# Patient Record
Sex: MALE | Race: BLACK OR AFRICAN AMERICAN | NOT HISPANIC OR LATINO | URBAN - METROPOLITAN AREA
[De-identification: names, ages, dates, MRNs, and addresses within clinical notes are randomized per-mention and may not be internally consistent; named-entity substitution may affect disease eponyms.]

---

## 2020-07-17 ENCOUNTER — INPATIENT (INPATIENT)
Facility: HOSPITAL | Age: 55
LOS: 2 days | Discharge: HOME | End: 2020-07-20
Attending: STUDENT IN AN ORGANIZED HEALTH CARE EDUCATION/TRAINING PROGRAM | Admitting: STUDENT IN AN ORGANIZED HEALTH CARE EDUCATION/TRAINING PROGRAM
Payer: MEDICAID

## 2020-07-17 VITALS
SYSTOLIC BLOOD PRESSURE: 198 MMHG | DIASTOLIC BLOOD PRESSURE: 116 MMHG | TEMPERATURE: 98 F | HEART RATE: 50 BPM | RESPIRATION RATE: 17 BRPM | OXYGEN SATURATION: 100 %

## 2020-07-17 DIAGNOSIS — Z01.818 ENCOUNTER FOR OTHER PREPROCEDURAL EXAMINATION: ICD-10-CM

## 2020-07-17 DIAGNOSIS — D35.2 BENIGN NEOPLASM OF PITUITARY GLAND: ICD-10-CM

## 2020-07-17 DIAGNOSIS — Z98.52 VASECTOMY STATUS: Chronic | ICD-10-CM

## 2020-07-17 DIAGNOSIS — D69.6 THROMBOCYTOPENIA, UNSPECIFIED: ICD-10-CM

## 2020-07-17 DIAGNOSIS — I10 ESSENTIAL (PRIMARY) HYPERTENSION: ICD-10-CM

## 2020-07-17 DIAGNOSIS — R00.1 BRADYCARDIA, UNSPECIFIED: ICD-10-CM

## 2020-07-17 DIAGNOSIS — R30.0 DYSURIA: ICD-10-CM

## 2020-07-17 DIAGNOSIS — Z98.890 OTHER SPECIFIED POSTPROCEDURAL STATES: Chronic | ICD-10-CM

## 2020-07-17 LAB
ALBUMIN SERPL ELPH-MCNC: 4.4 G/DL — SIGNIFICANT CHANGE UP (ref 3.3–5)
ALP SERPL-CCNC: 48 U/L — SIGNIFICANT CHANGE UP (ref 40–120)
ALT FLD-CCNC: 18 U/L — SIGNIFICANT CHANGE UP (ref 4–41)
ANION GAP SERPL CALC-SCNC: 10 MMO/L — SIGNIFICANT CHANGE UP (ref 7–14)
APPEARANCE UR: CLEAR — SIGNIFICANT CHANGE UP
APTT BLD: 31.9 SEC — SIGNIFICANT CHANGE UP (ref 27–36.3)
AST SERPL-CCNC: 21 U/L — SIGNIFICANT CHANGE UP (ref 4–40)
BASOPHILS # BLD AUTO: 0.05 K/UL — SIGNIFICANT CHANGE UP (ref 0–0.2)
BASOPHILS NFR BLD AUTO: 1.2 % — SIGNIFICANT CHANGE UP (ref 0–2)
BILIRUB SERPL-MCNC: 0.9 MG/DL — SIGNIFICANT CHANGE UP (ref 0.2–1.2)
BILIRUB UR-MCNC: NEGATIVE — SIGNIFICANT CHANGE UP
BLD GP AB SCN SERPL QL: NEGATIVE — SIGNIFICANT CHANGE UP
BLOOD UR QL VISUAL: NEGATIVE — SIGNIFICANT CHANGE UP
BUN SERPL-MCNC: 10 MG/DL — SIGNIFICANT CHANGE UP (ref 7–23)
CALCIUM SERPL-MCNC: 9.4 MG/DL — SIGNIFICANT CHANGE UP (ref 8.4–10.5)
CHLORIDE SERPL-SCNC: 103 MMOL/L — SIGNIFICANT CHANGE UP (ref 98–107)
CO2 SERPL-SCNC: 27 MMOL/L — SIGNIFICANT CHANGE UP (ref 22–31)
COLOR SPEC: SIGNIFICANT CHANGE UP
CREAT SERPL-MCNC: 1.1 MG/DL — SIGNIFICANT CHANGE UP (ref 0.5–1.3)
EOSINOPHIL # BLD AUTO: 0.19 K/UL — SIGNIFICANT CHANGE UP (ref 0–0.5)
EOSINOPHIL NFR BLD AUTO: 4.4 % — SIGNIFICANT CHANGE UP (ref 0–6)
FSH SERPL-MCNC: 3.5 IU/L — SIGNIFICANT CHANGE UP (ref 1.5–12.4)
GH SERPL-MCNC: 0.12 NG/ML — SIGNIFICANT CHANGE UP (ref 0.03–2.47)
GLUCOSE SERPL-MCNC: 111 MG/DL — HIGH (ref 70–99)
GLUCOSE UR-MCNC: NEGATIVE — SIGNIFICANT CHANGE UP
HCT VFR BLD CALC: 41.1 % — SIGNIFICANT CHANGE UP (ref 39–50)
HGB BLD-MCNC: 13.6 G/DL — SIGNIFICANT CHANGE UP (ref 13–17)
HIV 1+2 AB+HIV1 P24 AG SERPL QL IA: SIGNIFICANT CHANGE UP
IMM GRANULOCYTES NFR BLD AUTO: 0.2 % — SIGNIFICANT CHANGE UP (ref 0–1.5)
INR BLD: 1 — SIGNIFICANT CHANGE UP (ref 0.88–1.17)
KETONES UR-MCNC: NEGATIVE — SIGNIFICANT CHANGE UP
LEUKOCYTE ESTERASE UR-ACNC: NEGATIVE — SIGNIFICANT CHANGE UP
LH SERPL-ACNC: 3.7 IU/L — SIGNIFICANT CHANGE UP
LYMPHOCYTES # BLD AUTO: 1.98 K/UL — SIGNIFICANT CHANGE UP (ref 1–3.3)
LYMPHOCYTES # BLD AUTO: 45.8 % — HIGH (ref 13–44)
MAGNESIUM SERPL-MCNC: 2 MG/DL — SIGNIFICANT CHANGE UP (ref 1.6–2.6)
MCHC RBC-ENTMCNC: 29.2 PG — SIGNIFICANT CHANGE UP (ref 27–34)
MCHC RBC-ENTMCNC: 33.1 % — SIGNIFICANT CHANGE UP (ref 32–36)
MCV RBC AUTO: 88.2 FL — SIGNIFICANT CHANGE UP (ref 80–100)
MONOCYTES # BLD AUTO: 0.49 K/UL — SIGNIFICANT CHANGE UP (ref 0–0.9)
MONOCYTES NFR BLD AUTO: 11.3 % — SIGNIFICANT CHANGE UP (ref 2–14)
NEUTROPHILS # BLD AUTO: 1.6 K/UL — LOW (ref 1.8–7.4)
NEUTROPHILS NFR BLD AUTO: 37.1 % — LOW (ref 43–77)
NITRITE UR-MCNC: NEGATIVE — SIGNIFICANT CHANGE UP
NRBC # FLD: 0 K/UL — SIGNIFICANT CHANGE UP (ref 0–0)
PH UR: 6.5 — SIGNIFICANT CHANGE UP (ref 5–8)
PHOSPHATE SERPL-MCNC: 3.3 MG/DL — SIGNIFICANT CHANGE UP (ref 2.5–4.5)
PLATELET # BLD AUTO: 139 K/UL — LOW (ref 150–400)
PMV BLD: 12.1 FL — SIGNIFICANT CHANGE UP (ref 7–13)
POTASSIUM SERPL-MCNC: 3.9 MMOL/L — SIGNIFICANT CHANGE UP (ref 3.5–5.3)
POTASSIUM SERPL-SCNC: 3.9 MMOL/L — SIGNIFICANT CHANGE UP (ref 3.5–5.3)
PROLACTIN SERPL-MCNC: 36.5 NG/ML — HIGH (ref 4.1–18.4)
PROT SERPL-MCNC: 7.3 G/DL — SIGNIFICANT CHANGE UP (ref 6–8.3)
PROT UR-MCNC: NEGATIVE — SIGNIFICANT CHANGE UP
PROTHROM AB SERPL-ACNC: 11.5 SEC — SIGNIFICANT CHANGE UP (ref 9.8–13.1)
RBC # BLD: 4.66 M/UL — SIGNIFICANT CHANGE UP (ref 4.2–5.8)
RBC # FLD: 12.5 % — SIGNIFICANT CHANGE UP (ref 10.3–14.5)
RH IG SCN BLD-IMP: POSITIVE — SIGNIFICANT CHANGE UP
SARS-COV-2 RNA SPEC QL NAA+PROBE: SIGNIFICANT CHANGE UP
SODIUM SERPL-SCNC: 140 MMOL/L — SIGNIFICANT CHANGE UP (ref 135–145)
SP GR SPEC: 1.01 — SIGNIFICANT CHANGE UP (ref 1–1.04)
T3 SERPL-MCNC: 94.2 NG/DL — SIGNIFICANT CHANGE UP (ref 80–200)
T4 AB SER-ACNC: 4.44 UG/DL — LOW (ref 5.1–13)
TSH SERPL-MCNC: 2.02 UIU/ML — SIGNIFICANT CHANGE UP (ref 0.27–4.2)
UROBILINOGEN FLD QL: NORMAL — SIGNIFICANT CHANGE UP
WBC # BLD: 4.32 K/UL — SIGNIFICANT CHANGE UP (ref 3.8–10.5)
WBC # FLD AUTO: 4.32 K/UL — SIGNIFICANT CHANGE UP (ref 3.8–10.5)

## 2020-07-17 PROCEDURE — 77011 CT SCAN FOR LOCALIZATION: CPT | Mod: 26

## 2020-07-17 PROCEDURE — 99223 1ST HOSP IP/OBS HIGH 75: CPT

## 2020-07-17 PROCEDURE — 99285 EMERGENCY DEPT VISIT HI MDM: CPT

## 2020-07-17 RX ORDER — HYDRALAZINE HCL 50 MG
5 TABLET ORAL ONCE
Refills: 0 | Status: COMPLETED | OUTPATIENT
Start: 2020-07-17 | End: 2020-07-17

## 2020-07-17 RX ORDER — AMLODIPINE BESYLATE 2.5 MG/1
5 TABLET ORAL DAILY
Refills: 0 | Status: DISCONTINUED | OUTPATIENT
Start: 2020-07-17 | End: 2020-07-17

## 2020-07-17 RX ORDER — LOSARTAN POTASSIUM 100 MG/1
50 TABLET, FILM COATED ORAL DAILY
Refills: 0 | Status: DISCONTINUED | OUTPATIENT
Start: 2020-07-17 | End: 2020-07-19

## 2020-07-17 RX ORDER — LISINOPRIL 2.5 MG/1
10 TABLET ORAL DAILY
Refills: 0 | Status: DISCONTINUED | OUTPATIENT
Start: 2020-07-17 | End: 2020-07-17

## 2020-07-17 RX ADMIN — LOSARTAN POTASSIUM 50 MILLIGRAM(S): 100 TABLET, FILM COATED ORAL at 19:47

## 2020-07-17 RX ADMIN — Medication 5 MILLIGRAM(S): at 16:07

## 2020-07-17 RX ADMIN — LISINOPRIL 10 MILLIGRAM(S): 2.5 TABLET ORAL at 16:07

## 2020-07-17 NOTE — CONSULT NOTE ADULT - PROBLEM SELECTOR RECOMMENDATION 9
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 Secreting adenoma differential includes prolactinoma, growth hormone secreting, ACTH secreting. Pt w/o cushings appearence, glucose wnl. 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   - please consult endocrine for management regarding inititaion of dopamine agonist cabergoline or bromocriptine as neurosurgical intervention pending.

## 2020-07-17 NOTE — ED PROVIDER NOTE - NS ED ROS FT
REVIEW OF SYSTEMS:    CONSTITUTIONAL: No weakness, fevers or chills  EYES/ENT: Chronic? blurry vision;  No throat pain   NECK: No pain or stiffness  RESPIRATORY: No cough, wheezing, hemoptysis; No shortness of breath  CARDIOVASCULAR: No chest pain or palpitations  GASTROINTESTINAL: No abdominal pain; nausea, vomiting;  diarrhea or constipation. No hematemesis, melena or hematochezia.  GENITOURINARY: No dysuria, frequency or hematuria  NEUROLOGICAL: No numbness or weakness  SKIN: No itching, burning, rashes, or lesions   All other review of systems is negative unless indicated above.

## 2020-07-17 NOTE — CONSULT NOTE ADULT - PROBLEM SELECTOR RECOMMENDATION 5
- pt reports burning with urination, chronic UA reviewed w/o nitrites/leukocyte, no fevers, WBC wnl, would monitor off abx at this time  - pt w/ hx of vissectomy, consideration for ureteral stricture as pt endorses mild suprapubic tenderness, denies urinary incontinence.  - obtain post void bladder scan to make sure not retaining, can have outpatient urologic evaluation.

## 2020-07-17 NOTE — CONSULT NOTE ADULT - PROBLEM SELECTOR RECOMMENDATION 4
- mild thrombocytopenia w/o anemia, VI low concern for malignant hypertensive process, continue to trend at this time

## 2020-07-17 NOTE — CONSULT NOTE ADULT - PROBLEM SELECTOR RECOMMENDATION 2
- systolic consistently > 200 during inpatient stay. Per patient long family history of essential HTN in mother and siblings. Reports BP at home systolic range in 130s. On no antihypertensives at home, uses dietary measures for control. He reports during inpatient hospital stay his blood pressure increasingly elevated from anxiety/stress.   - continue with losartan 50 mg QD, if not controlled (systolic > 140) can start amlodipine 5 mg QD. If patient BP not controlled on ARB and calcium channel blocker may require thiazide.   - No electrolyte abnormalities to suggest secondary HTN (hyperaldosteronism, hyper-renin) however if patient BP not controlled with increasing antihypertensives would do renal US w/ doppler to r/o CHRIS and send renin/aldosterone levels for follow up.

## 2020-07-17 NOTE — ED ADULT TRIAGE NOTE - CHIEF COMPLAINT QUOTE
pt sent by PMD for neurosurgeon eval 2/2 chiasmal mass found on MRI causes visual changes.  pt arrives with MRI results, visual field test copy and ophthalmology note. pt hypertensive in triage.

## 2020-07-17 NOTE — CONSULT NOTE ADULT - ASSESSMENT
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

## 2020-07-17 NOTE — ED PROVIDER NOTE - OBJECTIVE STATEMENT
Mr. Verde is a 55 yo M with a PMH of TIA presenting from his neuro-opthalmology clinic for bitemporal visual field deficits and known chiasmal mass. In April, he presented for care when he had numbness/tingling in his hands and feet. He had a CTH and then An MRI on 4/13/2020 which demonstrated a sellar/suprasellar mass c/w pituitary adenoma. FSH, LH, ACTH, AM cortisol, TSH were WNL. Prolactin was elevated to 30.6. Then, he was seen by neuro-ophthalmology this morning where bitemporal visual field deficits were observed. He was sent to the ED for emergent neurosurgical evaluation.    He denies headache (even with straining, coughing), vomiting, . Vision worsening over last 5-10 years. Mr. Verde is a 53 yo M with a PMH of TIA presenting from his neuro-opthalmology clinic for bitemporal visual field deficits and known chiasmal mass. In April, he presented for care when he had numbness/tingling in his hands and feet. He had a CTH and then An MRI on 4/13/2020 which demonstrated a sellar/suprasellar mass c/w pituitary adenoma. FSH, LH, ACTH, AM cortisol, TSH were WNL. Prolactin was elevated to 30.6. Then, he was seen by neuro-ophthalmology this morning where bitemporal visual field deficits were observed. He was sent to the ED for emergent neurosurgical evaluation. He denies feeling that he cannot see out of the sides of his vision.    He denies headache (even with straining, coughing), vomiting, hearing changes. Vision worsening over last 5-10 years.

## 2020-07-17 NOTE — ED PROVIDER NOTE - ATTENDING CONTRIBUTION TO CARE
Pt was seen and evaluated by me. Pt is a 53 y/o male with a PMHx of TIA who presented to the ED for concern for mass. Pt was sent in by his neuro-ophthalmologist for bitemporal visual field deficits with concern for chiasmal mass. Pt states 3 months ago he had some numbness to hands and feet. Pt had an MRI on 4/13/20 that showed an suprasellar mass. Pt had hormone levels drawn that included FSH, LH, ACTH, AM cortisol, and TSH which were reported to be within normal limits. Prolactin was reported as elevated at 30.6. Pt's neuro-ophthalmologist sent pt in today for neurosurgery eval when he was noted to have bitemporal visual field deficits. Pt admits to blurry vision X years. Pt denies any headache, weakness, numbness, fever, chills, nausea, vomiting, SOB, chest pain, or abd pain. Lungs CTA b/l. RRR. Abd soft, non-tender. No focal deficits. VA 20/70 b/l.   Concern for intracranial mass  Labs, CT, Neurosurgery eval

## 2020-07-17 NOTE — CONSULT NOTE ADULT - SUBJECTIVE AND OBJECTIVE BOX
BIJU THAIS  55y  Male      Patient is a 55y old  Male who presents with a chief complaint of sent by Neuro Ophtho (2020 12:59)      INTERVAL HPI/OVERNIGHT EVENTS:    The patient is a 54 yo man with PMH of HTN, TIA?, known history of sellar lesion presenting from outpatient neuro-opthamology clinic for neurosurgical evaluation for worsening bitemporal hemianopsia. The patient reports having L sided UE/LE numbness/tingling in  prompting evaluation at [facility]. Imaging at that time revealed 3.3x2.3.x1.0 cm sella. Pt had f/u appointment at [facility] , found to have bitemporal visual field deficits. The patient endorses blurriness of vision, no diplopia. No headache. He further denies CP, SOB, abd pain, N/V. Pt reports history of pituitary tumor in cousin in [place] w/ visual field deficits that was resected. He denies worsening fatigue, changes in weight, skin changes, palpitations. He endorses worsening libido and erectile dysfunction for the past year. Pt has strong family history of hypertension, not on antihypertensives at home, reports measure BP at home to be systolic 130s on average. No prior cardiac history, including no PCI, CABG, valvular surgery No history of smoking.       REVIEW OF SYSTEMS:  CONSTITUTIONAL: No fever, weight loss, or fatigue  EYES: No eye pain, visual disturbances, or discharge  ENMT:  No difficulty hearing, tinnitus, vertigo; No sinus or throat pain  NECK: No pain or stiffness  BREASTS: No pain, masses, or nipple discharge  RESPIRATORY: No cough, wheezing, chills or hemoptysis; No shortness of breath  CARDIOVASCULAR: No chest pain, palpitations, dizziness, or leg swelling  GASTROINTESTINAL: No abdominal or epigastric pain. No nausea, vomiting, or hematemesis; No diarrhea or constipation. No melena or hematochezia.  GENITOURINARY: No dysuria, frequency, hematuria, or incontinence  NEUROLOGICAL: No headaches, memory loss, loss of strength, numbness, or tremors  SKIN: No itching, burning, rashes, or lesions   LYMPH NODES: No enlarged glands  ENDOCRINE: No heat or cold intolerance; No hair loss  MUSCULOSKELETAL: No joint pain or swelling; No muscle, back, or extremity pain  PSYCHIATRIC: No depression, anxiety, mood swings, or difficulty sleeping  HEME/LYMPH: No easy bruising, or bleeding gums  ALLERY AND IMMUNOLOGIC: No hives or eczema    T(C): 36.9 (20 @ 17:43), Max: 36.9 (20 @ 17:43)  HR: 51 (20 @ 17:43) (50 - 51)  BP: 198/114 (20 @ 17:43) (198/99 - 207/107)  RR: 18 (20 @ 17:43) (14 - 18)  SpO2: 100% (20 @ 17:43) (100% - 100%)  Wt(kg): --Vital Signs Last 24 Hrs  T(C): 36.9 (2020 17:43), Max: 36.9 (2020 17:43)  T(F): 98.5 (2020 17:43), Max: 98.5 (2020 17:43)  HR: 51 (2020 17:43) (50 - 51)  BP: 198/114 (2020 17:43) (198/99 - 207/107)  BP(mean): --  RR: 18 (2020 17:43) (14 - 18)  SpO2: 100% (2020 17:43) (100% - 100%)    PHYSICAL EXAM:  GENERAL: NAD, well-groomed, well-developed  HEAD:  Atraumatic, Normocephalic  EYES: EOMI, PERRLA, conjunctiva and sclera clear  ENMT: No tonsillar erythema, exudates, or enlargement; Moist mucous membranes, Good dentition, No lesions  NECK: Supple, No JVD, Normal thyroid  NERVOUS SYSTEM:  Alert & Oriented X3, Good concentration; Motor Strength 5/5 B/L upper and lower extremities; DTRs 2+ intact and symmetric  CHEST/LUNG: Clear to percussion bilaterally; No rales, rhonchi, wheezing, or rubs  HEART: Regular rate and rhythm; No murmurs, rubs, or gallops  ABDOMEN: Soft, Nontender, Nondistended; Bowel sounds present  EXTREMITIES:  2+ Peripheral Pulses, No clubbing, cyanosis, or edema  LYMPH: No lymphadenopathy noted  SKIN: No rashes or lesions    Consultant(s) Notes Reviewed:  [x ] YES  [ ] NO  Care Discussed with Consultants/Other Providers [ x] YES  [ ] NO    LABS:                        13.6   4.32  )-----------( 139      ( 2020 11:33 )             41.1     -    140  |  103  |  10  ----------------------------<  111<H>  3.9   |  27  |  1.10    Ca    9.4      2020 11:33  Phos  3.3       Mg     2.0         TPro  7.3  /  Alb  4.4  /  TBili  0.9  /  DBili  x   /  AST  21  /  ALT  18  /  AlkPhos  48  07-17    PT/INR - ( 2020 11:33 )   PT: 11.5 SEC;   INR: 1.00          PTT - ( 2020 11:33 )  PTT:31.9 SEC  Urinalysis Basic - ( 2020 13:26 )    Color: LIGHT YELLOW / Appearance: CLEAR / S.010 / pH: 6.5  Gluc: NEGATIVE / Ketone: NEGATIVE  / Bili: NEGATIVE / Urobili: NORMAL   Blood: NEGATIVE / Protein: NEGATIVE / Nitrite: NEGATIVE   Leuk Esterase: NEGATIVE / RBC: x / WBC x   Sq Epi: x / Non Sq Epi: x / Bacteria: x      CAPILLARY BLOOD GLUCOSE            Urinalysis Basic - ( 2020 13:26 )    Color: LIGHT YELLOW / Appearance: CLEAR / S.010 / pH: 6.5  Gluc: NEGATIVE / Ketone: NEGATIVE  / Bili: NEGATIVE / Urobili: NORMAL   Blood: NEGATIVE / Protein: NEGATIVE / Nitrite: NEGATIVE   Leuk Esterase: NEGATIVE / RBC: x / WBC x   Sq Epi: x / Non Sq Epi: x / Bacteria: x        RADIOLOGY & ADDITIONAL TESTS:    Imaging Personally Reviewed:  [ ] YES  [ ] NO

## 2020-07-17 NOTE — H&P ADULT - NSHPPHYSICALEXAM_GEN_ALL_CORE
awake, alert, affect appropriate  PERRL, EOMI  +Bitemporal visual loss - decrease  YIN x4 with good strength  No drift

## 2020-07-17 NOTE — ED PROVIDER NOTE - CLINICAL SUMMARY MEDICAL DECISION MAKING FREE TEXT BOX
Mr. Celaya is a 56 yo M with a chiasmal mass, bitemporal visual field deficits, bradycardia and hypertension raising concern for pituitary adenoma causing increased intracranial pressure. Presently, he is well appearing, but these visual field deficits and possible cushing reaction are concerning. Will consult neurosurgery and send pre-op labs. Complains of dysuria so will also send UA.

## 2020-07-17 NOTE — ED PROVIDER NOTE - PROGRESS NOTE DETAILS
Case d/w neurosurgery. Will obtain stereotactic cth in addition to prolactin, TSH, LH< FSH, GH, T3, T4, ACTH, Human GH, IGF1, FSH. Dr. Mathis: Pt seen by Neurosurg and will admit.

## 2020-07-17 NOTE — CONSULT NOTE ADULT - PROBLEM SELECTOR RECOMMENDATION 3
- likely vagal induced in setting of elevated systolic BP. EKG reviewed sinus w/ DC interval wnl w/o AV block. Expect heart rate to normalize with better BP control.

## 2020-07-17 NOTE — ED ADULT NURSE NOTE - OBJECTIVE STATEMENT
Receive pt. in ER room 23 alert and oriented x 4, presenting to the ER sent by PMD for visual changes due to pituitary tumor. Pt. have a history of HTN and stated " I was diagnosed with a pituitary tumor last month and it changes my vision". No c/o pain no respiratory distress, no dizziness. Placed on cardiac monitor, labs sent will continue to monitor.

## 2020-07-17 NOTE — H&P ADULT - HISTORY OF PRESENT ILLNESS
55y male w/ hx of sella mass diagnosed April 2020. Was seeing Neuro-ophtho clinic at [facility] and was sent into the hospital today for worsening bitemporal hemianopsia likely 2/2 sella mass. Per MRI report from [place] on 4/13/2020 he has a 3.3x2.3x1.9cm sella lesion. Patient admits to worsening visual fields. Denies any other symptoms like headache, increased thirst, difficulty urinating etc. 55y male w/ hx of sella mass diagnosed April 2020. Was seeing Neuro-ophtho clinic at [facility] yesterday and was sent into the hospital today for worsening bitemporal hemianopsia likely 2/2 sella mass. Per MRI report from [place] on 4/13/2020 he has a 3.3x2.3x1.9cm sella lesion. Patient admits to worsening visual fields. Denies any other symptoms like headache, increased thirst, difficulty urinating etc.

## 2020-07-17 NOTE — H&P ADULT - ASSESSMENT
55y male w/ sella mass now presenting with worsening vision loss  - Admit to Neurosurgery  -Endocrine consult, pituitary lab work up  -Vitals q4 hrs  -MRI brain w/wo contrast and sella protocol   -D/w Dr Briscoe

## 2020-07-17 NOTE — CHART NOTE - NSCHARTNOTEFT_GEN_A_CORE
CAPRINI SCORE [CLOT]    AGE RELATED RISK FACTORS                                                       MOBILITY RELATED FACTORS  [x ] Age 41-60 years                                            (1 Point)                  [ ] Bed rest                                                        (1 Point)  [ ] Age: 61-74 years                                           (2 Points)                 [ ] Plaster cast                                                   (2 Points)  [ ] Age= 75 years                                              (3 Points)                 [ ] Bed bound for more than 72 hours                 (2 Points)    DISEASE RELATED RISK FACTORS                                               GENDER SPECIFIC FACTORS  [ ] Edema in the lower extremities                       (1 Point)                  [ ] Pregnancy                                                     (1 Point)  [ ] Varicose veins                                               (1 Point)                  [ ] Post-partum < 6 weeks                                   (1 Point)             [ ] BMI > 25 Kg/m2                                            (1 Point)                  [ ] Hormonal therapy  or oral contraception          (1 Point)                 [ ] Sepsis (in the previous month)                        (1 Point)                  [ ] History of pregnancy complications                 (1 point)  [ ] Pneumonia or serious lung disease                                               [ ] Unexplained or recurrent                     (1 Point)           (in the previous month)                               (1 Point)  [ ] Abnormal pulmonary function test                     (1 Point)                 SURGERY RELATED RISK FACTORS  [ ] Acute myocardial infarction                              (1 Point)                 [ ]  Section                                             (1 Point)  [ ] Congestive heart failure (in the previous month)  (1 Point)               [ ] Minor surgery                                                  (1 Point)   [ ] Inflammatory bowel disease                             (1 Point)                 [ ] Arthroscopic surgery                                        (2 Points)  [ ] Central venous access                                      (2 Points)                [ ] General surgery lasting more than 45 minutes   (2 Points)       [ ] Stroke (in the previous month)                          (5 Points)               [ ] Elective arthroplasty                                         (5 Points)                                                                                                                                               HEMATOLOGY RELATED FACTORS                                                 TRAUMA RELATED RISK FACTORS  [ ] Prior episodes of VTE                                     (3 Points)                [ ] Fracture of the hip, pelvis, or leg                       (5 Points)  [ ] Positive family history for VTE                         (3 Points)                 [ ] Acute spinal cord injury (in the previous month)  (5 Points)  [ ] Prothrombin 15268 A                                     (3 Points)                 [ ] Paralysis  (less than 1 month)                             (5 Points)  [ ] Factor V Leiden                                             (3 Points)                  [ ] Multiple Trauma within 1 month                        (5 Points)  [ ] Lupus anticoagulants                                     (3 Points)                                                           [ ] Anticardiolipin antibodies                               (3 Points)                                                       [ ] High homocysteine in the blood                      (3 Points)                                             [ ] Other congenital or acquired thrombophilia      (3 Points)                                                [ ] Heparin induced thrombocytopenia                  (3 Points)                                          Total Score [    1      ]    Caprini Score 0 - 2:  Low Risk, No VTE Prophylaxis required for most patients, encourage ambulation  Caprini Score 3 - 6:  At Risk, pharmacologic VTE prophylaxis is indicated for most patients (in the absence of a contraindication)  Caprini Score Greater than or = 7:  High Risk, pharmacologic VTE prophylaxis is indicated for most patients (in the absence of a contraindication)

## 2020-07-17 NOTE — CONSULT NOTE ADULT - PROBLEM SELECTOR RECOMMENDATION 6
- pt w/o any history of general anestesia, reports only surgery is vissectomy with local anestesia w/o complications. Pt w/o bleeding disorders, no AC use at home. No history of smoking, MOOSE, COPD. Able to walk 3-4 miles with stopping.   - RCRI in setting of possible TIA in 4/20 score 1 placing patient with 6.0% 30 day risk of death, MI, or cardiac arrest after surgery.  - Patient's blood pressure needs to be further optimized prior to surgery

## 2020-07-17 NOTE — ED PROVIDER NOTE - PHYSICAL EXAMINATION
PHYSICAL EXAM:  General: No acute distress.  HEENT: NCAT.  PERRL.  EOMI.  No scleral icterus or injection.  Moist MM.  No oropharyngeal exudates.    Neck: Supple.  Full ROM.  No JVD.  No thyromegaly. No lymphadenopathy.   Heart: RRR. Bradycardic.  Normal S1 and S2.  No murmurs, rubs, or gallops.   Lungs: CTAB. No wheezes, crackles, or rhonchi.    Abdomen: BS+, soft, NT/ND.  No organomegaly.  Skin: Warm and dry.  No rashes.  Extremities: No edema, clubbing, or cyanosis.  2+ peripheral pulses b/l.  Musculoskeletal: No deformities.  No spinal or paraspinal tenderness.  Neuro: A&Ox3.  CN II - 20/70 bilaterally, fields intact to confrontation. PERRL. CNIII - L eye does not move fully laterally. No nystagmus. IV-XII intact.  5/5 strength in UE and LE b/l.  Tactile sensation intact in UE and LE b/l. 2+ biceps and brachioradialis bilaterally.

## 2020-07-18 LAB — ACTH SER-ACNC: 29.7 PG/ML — SIGNIFICANT CHANGE UP (ref 7.2–63.3)

## 2020-07-18 PROCEDURE — 70553 MRI BRAIN STEM W/O & W/DYE: CPT | Mod: 26

## 2020-07-18 PROCEDURE — 99232 SBSQ HOSP IP/OBS MODERATE 35: CPT

## 2020-07-18 RX ADMIN — LOSARTAN POTASSIUM 50 MILLIGRAM(S): 100 TABLET, FILM COATED ORAL at 05:54

## 2020-07-18 NOTE — PROGRESS NOTE ADULT - PROBLEM SELECTOR PLAN 3
likely vagal induced in setting of elevated systolic BP. Asymptomatic  EKG: sinus w/ OR interval wnl w/o AV block.   Expect heart rate to normalize with better BP control.

## 2020-07-18 NOTE — PROGRESS NOTE ADULT - PROBLEM SELECTOR PLAN 1
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  - Endocrine consult recommended

## 2020-07-18 NOTE — PROGRESS NOTE ADULT - SUBJECTIVE AND OBJECTIVE BOX
CHIEF COMPLAINT: f/u     SUBJECTIVE / OVERNIGHT EVENTS: No acute events overnight. Denies chest pain, SOB, N/V, lightheadedness. States he feels blurry vision is slightly improved now that his BP is improving.    MEDICATIONS  (STANDING):  losartan 50 milliGRAM(s) Oral daily    MEDICATIONS  (PRN):      VITALS:  T(F): 98.5 (20 @ 09:44), Max: 98.5 (20 @ 17:43)  HR: 58 (20 @ 09:44) (51 - 58)  BP: 147/89 (20 @ 09:44) (137/94 - 207/107)  RR: 17 (20 @ 09:44) (14 - 18)  SpO2: 100% (20 @ 09:44)  Wt(kg): --    Weight (kg): 104.5 (17:43)    CAPILLARY BLOOD GLUCOSE      PHYSICAL EXAM:  GENERAL: NAD, on room air  HEENT: tracking with eyes, sclera clear, wearing reading glasses  CHEST/LUNG: Clear to auscultation bilaterally; No wheeze  HEART: Regular rate and rhythm; No murmurs, rubs, or gallops  ABDOMEN: Soft, Nontender, Nondistended; Bowel sounds present  EXTREMITIES: WWP, no edema  PSYCH: AAOx3  NEUROLOGY: 5/5 muscle strength in upper and lower extremities  SKIN: No rashes or lesions    LABS:              13.6                 140  | 27   | 10           4.32  >-----------< 139     ------------------------< 111                   41.1                 3.9  | 103  | 1.10                                         Ca 9.4   Mg 2.0   Ph 3.3         TPro  7.3  /  Alb  4.4      TBili  0.9  /  DBili  x         AST  21  /  ALT  18            AlkPhos  48      INR: 1.00 ;    PT: 11.5 SEC;    PTT: 31.9 SEC        Urinalysis Basic - ( 2020 13:26 )    Color: LIGHT YELLOW / Appearance: CLEAR / S.010 / pH: 6.5  Gluc: NEGATIVE / Ketone: NEGATIVE  / Bili: NEGATIVE / Urobili: NORMAL   Blood: NEGATIVE / Protein: NEGATIVE / Nitrite: NEGATIVE   Leuk Esterase: NEGATIVE / RBC: x / WBC x   Sq Epi: x / Non Sq Epi: x / Bacteria: x            MICROBIOLOGY:        RADIOLOGY & ADDITIONAL TESTS:  Imaging Personally Reviewed: [ ] Yes    [ ] Consultant(s) Notes Reviewed:  [x] Care Discussed with Consultants/Other Providers:

## 2020-07-18 NOTE — PROGRESS NOTE ADULT - PROBLEM SELECTOR PLAN 6
pt w/o any history of general anesthesia, reports only surgery is vasectomy with local anesthesia w/o complications. Pt w/o bleeding disorders, no AC use at home. No history of smoking, MOOSE, COPD. Able to walk 3-4 miles with stopping.   - RCRI in setting of possible TIA in 4/20 score 1 placing patient with 6.0% 30 day risk of death, MI, or cardiac arrest after surgery.  BP better controlled  No further cardiac workup required prior to OR

## 2020-07-18 NOTE — PROGRESS NOTE ADULT - ASSESSMENT
54 yo man with PMH of HTN (not adherent to meds), TIA?, known history of sellar lesion presenting from outpatient neuro-opthamology clinic for neurosurgical evaluation for worsening bitemporal hemianopsia likely 2/2 sellar lesion and found also to have uncontrolled HTN

## 2020-07-18 NOTE — PROGRESS NOTE ADULT - SUBJECTIVE AND OBJECTIVE BOX
No issues overnight  Vital Signs Last 24 Hrs  T(C): 36.7 (18 Jul 2020 01:28), Max: 36.9 (17 Jul 2020 17:43)  T(F): 98 (18 Jul 2020 01:28), Max: 98.5 (17 Jul 2020 17:43)  HR: 54 (18 Jul 2020 01:28) (50 - 54)  BP: 165/113 (18 Jul 2020 01:28) (155/103 - 207/107)  BP(mean): --  RR: 18 (18 Jul 2020 01:28) (14 - 18)  SpO2: 100% (18 Jul 2020 01:28) (100% - 100%)    AAO X 3  PERRLA, EOMI  Bitemporal anopsia  YIN strength 5/5  SILT    MEDICATIONS  (STANDING):  losartan 50 milliGRAM(s) Oral daily    MEDICATIONS  (PRN):    07-17    140  |  103  |  10  ----------------------------<  111<H>  3.9   |  27  |  1.10    Ca    9.4      17 Jul 2020 11:33  Phos  3.3     07-17  Mg     2.0     07-17    TPro  7.3  /  Alb  4.4  /  TBili  0.9  /  DBili  x   /  AST  21  /  ALT  18  /  AlkPhos  48  07-17                          13.6   4.32  )-----------( 139      ( 17 Jul 2020 11:33 )             41.1     < from: CT Stereotactic Localization No Cont (07.17.20 @ 12:35) >  Multiple thin axial sections were performed from base of skull to vertex without contrast enhancement. Thisexam is performed date stereotactic localization purposes.    No prior studies available for comparison.    Asymmetry of the lateral ventricles are seen. The right lateral ventricle is slightly more prominent than the left.    There is evidence of a soft tissue mass involving the sella/suprasellar region. 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 This lesion measures approximately 3.0 x 3.6 cm. There is no acute hemorrhage mass or mass effect in the posterior fossa or supratentorial region.    Evaluation of the osseous structures with the appropriate window appears unremarkable    Minimal mucosal thickening is seen in the left maxillary sinus.    Both mastoid and middle ear regions appear clear.    IMPRESSION: Sella/suprasellar lesion as described above.    < end of copied text >

## 2020-07-18 NOTE — PROGRESS NOTE ADULT - PROBLEM SELECTOR PLAN 2
uncontrolled on admission with SBP up to 200s, now improved,   SBP in 140s ok for now, allow for gradual normotension  Continue losartan 50mg  Patient refusing norvasc due to LE edema

## 2020-07-19 LAB
ALBUMIN SERPL ELPH-MCNC: 4.3 G/DL — SIGNIFICANT CHANGE UP (ref 3.3–5)
ALP SERPL-CCNC: 43 U/L — SIGNIFICANT CHANGE UP (ref 40–120)
ALT FLD-CCNC: 15 U/L — SIGNIFICANT CHANGE UP (ref 4–41)
ANION GAP SERPL CALC-SCNC: 10 MMO/L — SIGNIFICANT CHANGE UP (ref 7–14)
APTT BLD: 30.5 SEC — SIGNIFICANT CHANGE UP (ref 27–36.3)
AST SERPL-CCNC: 16 U/L — SIGNIFICANT CHANGE UP (ref 4–40)
BILIRUB SERPL-MCNC: 0.9 MG/DL — SIGNIFICANT CHANGE UP (ref 0.2–1.2)
BUN SERPL-MCNC: 13 MG/DL — SIGNIFICANT CHANGE UP (ref 7–23)
CALCIUM SERPL-MCNC: 9.3 MG/DL — SIGNIFICANT CHANGE UP (ref 8.4–10.5)
CHLORIDE SERPL-SCNC: 104 MMOL/L — SIGNIFICANT CHANGE UP (ref 98–107)
CO2 SERPL-SCNC: 24 MMOL/L — SIGNIFICANT CHANGE UP (ref 22–31)
CORTIS SERPL-MCNC: 4.1 UG/DL — SIGNIFICANT CHANGE UP (ref 2.7–18.4)
CREAT SERPL-MCNC: 1.11 MG/DL — SIGNIFICANT CHANGE UP (ref 0.5–1.3)
GLUCOSE SERPL-MCNC: 93 MG/DL — SIGNIFICANT CHANGE UP (ref 70–99)
HCT VFR BLD CALC: 40.8 % — SIGNIFICANT CHANGE UP (ref 39–50)
HGB BLD-MCNC: 13.3 G/DL — SIGNIFICANT CHANGE UP (ref 13–17)
IGF BP1 SERPL-MCNC: 98 NG/ML — SIGNIFICANT CHANGE UP (ref 74–255)
INR BLD: 1.03 — SIGNIFICANT CHANGE UP (ref 0.88–1.17)
MCHC RBC-ENTMCNC: 28.4 PG — SIGNIFICANT CHANGE UP (ref 27–34)
MCHC RBC-ENTMCNC: 32.6 % — SIGNIFICANT CHANGE UP (ref 32–36)
MCV RBC AUTO: 87 FL — SIGNIFICANT CHANGE UP (ref 80–100)
NRBC # FLD: 0 K/UL — SIGNIFICANT CHANGE UP (ref 0–0)
PLATELET # BLD AUTO: 159 K/UL — SIGNIFICANT CHANGE UP (ref 150–400)
PMV BLD: 12 FL — SIGNIFICANT CHANGE UP (ref 7–13)
POTASSIUM SERPL-MCNC: 3.9 MMOL/L — SIGNIFICANT CHANGE UP (ref 3.5–5.3)
POTASSIUM SERPL-SCNC: 3.9 MMOL/L — SIGNIFICANT CHANGE UP (ref 3.5–5.3)
PROT SERPL-MCNC: 6.9 G/DL — SIGNIFICANT CHANGE UP (ref 6–8.3)
PROTHROM AB SERPL-ACNC: 11.8 SEC — SIGNIFICANT CHANGE UP (ref 9.8–13.1)
RBC # BLD: 4.69 M/UL — SIGNIFICANT CHANGE UP (ref 4.2–5.8)
RBC # FLD: 12.3 % — SIGNIFICANT CHANGE UP (ref 10.3–14.5)
SODIUM SERPL-SCNC: 138 MMOL/L — SIGNIFICANT CHANGE UP (ref 135–145)
WBC # BLD: 3.97 K/UL — SIGNIFICANT CHANGE UP (ref 3.8–10.5)
WBC # FLD AUTO: 3.97 K/UL — SIGNIFICANT CHANGE UP (ref 3.8–10.5)

## 2020-07-19 PROCEDURE — 99232 SBSQ HOSP IP/OBS MODERATE 35: CPT

## 2020-07-19 RX ORDER — HYDRALAZINE HCL 50 MG
5 TABLET ORAL ONCE
Refills: 0 | Status: COMPLETED | OUTPATIENT
Start: 2020-07-19 | End: 2020-07-19

## 2020-07-19 RX ORDER — LOSARTAN POTASSIUM 100 MG/1
100 TABLET, FILM COATED ORAL DAILY
Refills: 0 | Status: DISCONTINUED | OUTPATIENT
Start: 2020-07-19 | End: 2020-07-20

## 2020-07-19 RX ADMIN — Medication 5 MILLIGRAM(S): at 05:13

## 2020-07-19 RX ADMIN — LOSARTAN POTASSIUM 100 MILLIGRAM(S): 100 TABLET, FILM COATED ORAL at 09:33

## 2020-07-19 RX ADMIN — Medication 5 MILLIGRAM(S): at 17:28

## 2020-07-19 NOTE — PROGRESS NOTE ADULT - PROBLEM SELECTOR PLAN 3
likely vagal induced in setting of elevated systolic BP. Asymptomatic  EKG: sinus w/ IL interval wnl w/o AV block.   Expect heart rate to normalize with better BP control, possible effect from brain mass?

## 2020-07-19 NOTE — PROGRESS NOTE ADULT - SUBJECTIVE AND OBJECTIVE BOX
CHIEF COMPLAINT: f/u     SUBJECTIVE / OVERNIGHT EVENTS: No acute events overnight. Denies chest pain, SOB, dizziness or lightheadedness. No N/V    MEDICATIONS  (STANDING):  losartan 100 milliGRAM(s) Oral daily    MEDICATIONS  (PRN):      VITALS:  T(F): 98 (20 @ 09:31), Max: 98.6 (20 @ 17:23)  HR: 51 (20 @ 09:31) (48 - 56)  BP: 149/91 (20 @ 09:31) (148/83 - 179/96)  RR: 18 (20 @ 09:31) (16 - 18)  SpO2: 99% (20 @ 09:31)  Wt(kg): --      CAPILLARY BLOOD GLUCOSE      PHYSICAL EXAM:  GENERAL: NAD, on room air  HEENT: tracking with eyes, sclera clear, wearing reading glasses  CHEST/LUNG: Clear to auscultation bilaterally; No wheeze  HEART: Regular rate and rhythm; No murmurs, rubs, or gallops  ABDOMEN: Soft, Nontender, Nondistended; Bowel sounds present  EXTREMITIES: WWP, no edema  PSYCH: AAOx3  NEUROLOGY: 5/5 muscle strength in upper and lower extremities  SKIN: No rashes or lesions    LABS:              13.3                 138  | 24   | 13           3.97  >-----------< 159     ------------------------< 93                    40.8                 3.9  | 104  | 1.11                                         Ca 9.3   Mg x     Ph x           TPro  6.9  /  Alb  4.3      TBili  0.9  /  DBili  x         AST  16  /  ALT  15            AlkPhos  43      INR: 1.03 ;    PT: 11.8 SEC;    PTT: 30.5 SEC        Urinalysis Basic - ( 2020 13:26 )    Color: LIGHT YELLOW / Appearance: CLEAR / S.010 / pH: 6.5  Gluc: NEGATIVE / Ketone: NEGATIVE  / Bili: NEGATIVE / Urobili: NORMAL   Blood: NEGATIVE / Protein: NEGATIVE / Nitrite: NEGATIVE   Leuk Esterase: NEGATIVE / RBC: x / WBC x   Sq Epi: x / Non Sq Epi: x / Bacteria: x            MICROBIOLOGY:        RADIOLOGY & ADDITIONAL TESTS:  Imaging Personally Reviewed: [ ] Yes    [ ] Consultant(s) Notes Reviewed:  [x] Care Discussed with Consultants/Other Providers:

## 2020-07-19 NOTE — PROGRESS NOTE ADULT - SUBJECTIVE AND OBJECTIVE BOX
No issues overnight  Vital Signs Last 24 Hrs  T(C): 36.6 (19 Jul 2020 01:27), Max: 37 (18 Jul 2020 17:23)  T(F): 97.9 (19 Jul 2020 01:27), Max: 98.6 (18 Jul 2020 17:23)  HR: 48 (19 Jul 2020 01:27) (48 - 58)  BP: 179/96 (19 Jul 2020 01:27) (137/94 - 179/96)  BP(mean): --  RR: 18 (19 Jul 2020 01:27) (16 - 18)  SpO2: 97% (19 Jul 2020 01:27) (97% - 100%)    AAO X 3  PERRLA, EOMI  Bitemporal anopsia  YIN strength 5/5  SILT    MEDICATIONS  (STANDING):  losartan 50 milliGRAM(s) Oral daily    MEDICATIONS  (PRN):

## 2020-07-19 NOTE — PROGRESS NOTE ADULT - PROBLEM SELECTOR PLAN 1
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  - Endocrine consult recommended  Plan per neurosurgery

## 2020-07-19 NOTE — PROGRESS NOTE ADULT - PROBLEM SELECTOR PLAN 2
uncontrolled on admission with SBP up to 200s, now improved  SBP in 140s ok for now, allow for gradual normotension  increase losartan 100mg  Patient refusing norvasc due to LE edema

## 2020-07-20 ENCOUNTER — TRANSCRIPTION ENCOUNTER (OUTPATIENT)
Age: 55
End: 2020-07-20

## 2020-07-20 VITALS
SYSTOLIC BLOOD PRESSURE: 150 MMHG | DIASTOLIC BLOOD PRESSURE: 94 MMHG | TEMPERATURE: 98 F | OXYGEN SATURATION: 98 % | HEART RATE: 57 BPM | RESPIRATION RATE: 16 BRPM

## 2020-07-20 LAB
BLD GP AB SCN SERPL QL: NEGATIVE — SIGNIFICANT CHANGE UP
RH IG SCN BLD-IMP: POSITIVE — SIGNIFICANT CHANGE UP

## 2020-07-20 PROCEDURE — 99232 SBSQ HOSP IP/OBS MODERATE 35: CPT

## 2020-07-20 PROCEDURE — 99239 HOSP IP/OBS DSCHRG MGMT >30: CPT

## 2020-07-20 RX ORDER — AMLODIPINE BESYLATE 2.5 MG/1
1 TABLET ORAL
Qty: 0 | Refills: 0 | DISCHARGE

## 2020-07-20 RX ORDER — LOSARTAN POTASSIUM 100 MG/1
1 TABLET, FILM COATED ORAL
Qty: 0 | Refills: 0 | DISCHARGE
Start: 2020-07-20

## 2020-07-20 RX ORDER — HYDROCHLOROTHIAZIDE 25 MG
25 TABLET ORAL DAILY
Refills: 0 | Status: DISCONTINUED | OUTPATIENT
Start: 2020-07-20 | End: 2020-07-20

## 2020-07-20 RX ORDER — LISINOPRIL 2.5 MG/1
1 TABLET ORAL
Qty: 0 | Refills: 0 | DISCHARGE

## 2020-07-20 RX ORDER — LOSARTAN POTASSIUM 100 MG/1
1 TABLET, FILM COATED ORAL
Qty: 30 | Refills: 0
Start: 2020-07-20 | End: 2020-08-18

## 2020-07-20 RX ORDER — LISINOPRIL 2.5 MG/1
1 TABLET ORAL
Qty: 30 | Refills: 0
Start: 2020-07-20 | End: 2020-08-18

## 2020-07-20 RX ADMIN — LOSARTAN POTASSIUM 100 MILLIGRAM(S): 100 TABLET, FILM COATED ORAL at 05:39

## 2020-07-20 NOTE — DISCHARGE NOTE NURSING/CASE MANAGEMENT/SOCIAL WORK - NSDCPEPTSTRK_GEN_ALL_CORE
Stroke warning signs and symptoms/Signs and symptoms of stroke/Risk factors for stroke/Stroke education booklet/Call 911 for stroke/Stroke support groups for patients, families, and friends/Prescribed medications/Need for follow up after discharge

## 2020-07-20 NOTE — PROGRESS NOTE ADULT - SUBJECTIVE AND OBJECTIVE BOX
Patient is a 55y old  Male who presents with a chief complaint of sent by Neuro Ophtho (20 Jul 2020 01:07)        SUBJECTIVE / OVERNIGHT EVENTS:  no acute events o/n  pt's surgery postponed til Wednesday  pt sitting up in bed eating breakfast  Denies chest pain, SOB, dizziness or lightheadedness. No N/V      MEDICATIONS  (STANDING):  losartan 100 milliGRAM(s) Oral daily    MEDICATIONS  (PRN):      Vital Signs Last 24 Hrs  T(C): 36.5 (20 Jul 2020 09:54), Max: 37.2 (19 Jul 2020 17:27)  T(F): 97.7 (20 Jul 2020 09:54), Max: 99 (19 Jul 2020 17:27)  HR: 58 (20 Jul 2020 09:54) (50 - 64)  BP: 146/96 (20 Jul 2020 09:54) (146/96 - 171/108)  BP(mean): --  RR: 18 (20 Jul 2020 09:54) (15 - 18)  SpO2: 100% (20 Jul 2020 09:54) (94% - 100%)  CAPILLARY BLOOD GLUCOSE        I&O's Summary    19 Jul 2020 07:01  -  20 Jul 2020 07:00  --------------------------------------------------------  IN: 1110 mL / OUT: 0 mL / NET: 1110 mL    20 Jul 2020 07:01  -  20 Jul 2020 11:09  --------------------------------------------------------  IN: 240 mL / OUT: 0 mL / NET: 240 mL          PHYSICAL EXAM  GENERAL: NAD, on room air  CHEST/LUNG: Clear to auscultation bilaterally; No wheeze  HEART: Regular rate and rhythm; No murmurs, rubs, or gallops  ABDOMEN: Soft, Nontender, Nondistended; Bowel sounds present  EXTREMITIES: WWP, no edema  NEUROLOGY: 5/5 muscle strength in upper and lower extremities      LABS:                        13.3   3.97  )-----------( 159      ( 19 Jul 2020 07:53 )             40.8     07-19    138  |  104  |  13  ----------------------------<  93  3.9   |  24  |  1.11    Ca    9.3      19 Jul 2020 07:53    TPro  6.9  /  Alb  4.3  /  TBili  0.9  /  DBili  x   /  AST  16  /  ALT  15  /  AlkPhos  43  07-19    PT/INR - ( 19 Jul 2020 07:53 )   PT: 11.8 SEC;   INR: 1.03          PTT - ( 19 Jul 2020 07:53 )  PTT:30.5 SEC          RADIOLOGY & ADDITIONAL TESTS:    Imaging Personally Reviewed:  Consultant(s) Notes Reviewed:    Care Discussed with Consultants/Other Providers:

## 2020-07-20 NOTE — PROVIDER CONTACT NOTE (OTHER) - RECOMMENDATIONS
order blood pressure medication
one time dose of hydralazine?
order another blood pressure medication for 2200 med pass?

## 2020-07-20 NOTE — PROVIDER CONTACT NOTE (OTHER) - ACTION/TREATMENT ORDERED:
Will continue to monitor.
provider has not responded.
Continue to monitor.
Recheck BP in one hour.
one time dose of hydralazine ordered.
continue to monitor. provider will assess previous blood pressures. day team will assess the need for another medication.
continue to monitor. surgery cancelled for today.

## 2020-07-20 NOTE — PROVIDER CONTACT NOTE (OTHER) - REASON
/113, HR 54
blood pressure increasing
hypertensive 167/95
increased blood pressure
increasing bp
/103, HR 53
high blood pressure

## 2020-07-20 NOTE — DISCHARGE NOTE PROVIDER - CARE PROVIDER_API CALL
Trevor Briscoe  NEUROSURGERY - GENERAL  [address] [place] [id number]  Phone: [phone]  Fax: [phone]  Follow Up Time: 1-3 days

## 2020-07-20 NOTE — PROGRESS NOTE ADULT - PROBLEM SELECTOR PLAN 2
uncontrolled on admission with SBP up to 200s, now improved  SBP in 140s ok for now, allow for gradual normotension  c/w losartan 100mg  Patient refusing norvasc due to LE edema uncontrolled on admission with SBP up to 200s, now improved  c/w losartan 100mg, add HCTZ 25   Patient refusing Norvasc due to LE edema

## 2020-07-20 NOTE — PROGRESS NOTE ADULT - SUBJECTIVE AND OBJECTIVE BOX
No issues overnight  Vital Signs Last 24 Hrs  T(C): 36.8 (19 Jul 2020 21:33), Max: 37.2 (19 Jul 2020 17:27)  T(F): 98.2 (19 Jul 2020 21:33), Max: 99 (19 Jul 2020 17:27)  HR: 60 (19 Jul 2020 21:33) (48 - 64)  BP: 155/85 (19 Jul 2020 21:33) (148/83 - 179/96)  BP(mean): --  RR: 16 (19 Jul 2020 21:33) (16 - 18)  SpO2: 99% (19 Jul 2020 21:33) (96% - 99%)    AAO X 3  PERRLA, EOMI  Bitemporal anopsia  YIN strength 5/5  SILT    MEDICATIONS  (STANDING):  losartan 100 milliGRAM(s) Oral daily    MEDICATIONS  (PRN):                          13.3   3.97  )-----------( 159      ( 19 Jul 2020 07:53 )             40.8     07-19    138  |  104  |  13  ----------------------------<  93  3.9   |  24  |  1.11    Ca    9.3      19 Jul 2020 07:53    TPro  6.9  /  Alb  4.3  /  TBili  0.9  /  DBili  x   /  AST  16  /  ALT  15  /  AlkPhos  43  07-19    PT/INR - ( 19 Jul 2020 07:53 )   PT: 11.8 SEC;   INR: 1.03          PTT - ( 19 Jul 2020 07:53 )  PTT:30.5 SEC    Type + Screen (07.17.20 @ 11:46)    ABO Interpretation: B    Rh Interpretation: Positive    Antibody Screen: Negative    COVID-19 PCR: NotDetec (17 Jul 2020 11:56)

## 2020-07-20 NOTE — DISCHARGE NOTE PROVIDER - NSDCFUADDINST_GEN_ALL_CORE_FT
Return to ER if new or worsening symptoms.  Call Dr Briscoe's office to schedule your surgery as an outpatient.

## 2020-07-20 NOTE — DISCHARGE NOTE NURSING/CASE MANAGEMENT/SOCIAL WORK - PATIENT PORTAL LINK FT
You can access the FollowMyHealth Patient Portal offered by [facility] by registering at the following website: [email or web address]. By joining [facility]’s FollowMyHealth portal, you will also be able to view your health information using other applications (apps) compatible with our system.

## 2020-07-20 NOTE — PROVIDER CONTACT NOTE (OTHER) - BACKGROUND
56 y/o m admitted for Benign neoplasm of pituitary gland. Hx of TIA, HTN.
came in for worsening bitemproal hemianopsia
54 y/o m admitted for benign neoplasm of pituitary gland
56 yo male admitted for benign neoplasm of pituitary gland.
admitted for neoplasm of pituitary gland.

## 2020-07-20 NOTE — DISCHARGE NOTE PROVIDER - NSDCMRMEDTOKEN_GEN_ALL_CORE_FT
amLODIPine 5 mg oral tablet: 1 tab(s) orally once a day  hydroCHLOROthiazide 25 mg oral tablet: 1 tab(s) orally once a day  lisinopril 10 mg oral tablet: 1 tab(s) orally once a day  losartan 100 mg oral tablet: 1 tab(s) orally once a day hydroCHLOROthiazide 25 mg oral tablet: 1 tab(s) orally once a day  hydroCHLOROthiazide 25 mg oral tablet: 1 tab(s) orally once a day  lisinopril 10 mg oral tablet: 1 tab(s) orally once a day hydroCHLOROthiazide 25 mg oral tablet: 1 tab(s) orally once a day  hydroCHLOROthiazide 25 mg oral tablet: 1 tab(s) orally once a day  lisinopril 10 mg oral tablet: 1 tab(s) orally once a day  losartan 100 mg oral tablet: 1 tab(s) orally once a day hydroCHLOROthiazide 25 mg oral tablet: 1 tab(s) orally once a day  hydroCHLOROthiazide 25 mg oral tablet: 1 tab(s) orally once a day  losartan 100 mg oral tablet: 1 tab(s) orally once a day

## 2020-07-20 NOTE — PRE-OP CHECKLIST - HEIGHT IN INCHES
Pt comes in with chest wall pain when he takes a deep breath.  Lungs clear and no fever chills noted IVl palced and pending lab work Mpuleorn 0

## 2020-07-20 NOTE — PROVIDER CONTACT NOTE (OTHER) - ASSESSMENT
VS: T-98.3 , HR-53, BP- 155/103, RR- 17, Spo2 100%. Pt is asymptomatic- Pt denies chest pain, SOB, dizziness, H/A, lightheadedness.
no complains of headache, fever, nausea or vomiting
A&Ox4. No acute distress noted.
VS: T- 98, HR 54, /113, RR 18, Spo2 100%. Pt asymptomatic, denies chest pain, SOB, dizziness, light headedness.
blood pressure of 160/94. all other vital signs stable, slightly bradycardic. no adverse signs/symptoms noted.
blood pressure of 179/96, bradycardic. no adverse signs/symptoms noted. patient asleep.
blood pressures labile, during the evening they were stable. 0600 blood pressure was 171/97. no adverse signs/symptoms noted.

## 2020-07-20 NOTE — PROGRESS NOTE ADULT - REASON FOR ADMISSION
sent by Neuro Ophtho

## 2020-07-20 NOTE — DISCHARGE NOTE PROVIDER - CARE PROVIDERS DIRECT ADDRESSES
[email or web address]

## 2020-07-20 NOTE — PROGRESS NOTE ADULT - PROBLEM SELECTOR PLAN 3
likely vagal induced in setting of elevated systolic BP. Asymptomatic  EKG: sinus w/ DC interval wnl w/o AV block.   Expect heart rate to normalize with better BP control, possible effect from brain mass?

## 2020-07-20 NOTE — PROVIDER CONTACT NOTE (OTHER) - DATE AND TIME:
17-Jul-2020 18:05
17-Jul-2020 20:38
18-Jul-2020 02:28
18-Jul-2020 21:00
19-Jul-2020 04:00
19-Jul-2020 14:10
20-Jul-2020 07:30

## 2020-07-20 NOTE — PROVIDER CONTACT NOTE (OTHER) - SITUATION
Losartan given by day shift due to BP measuring 198/114. BP is now 155/103.
blood pressure of 198/114
Bp 165/113, HR 54
hypertensive 167/95
patient 0200 vitals stable, blood pressure increasing since last call.
patient blood pressure spiking.
patient has elevated blood pressure of 160/94. no adverse signs/symptoms noted. patient questioning why he has only one blood pressure medication at 0600.

## 2020-07-20 NOTE — DISCHARGE NOTE PROVIDER - HOSPITAL COURSE
55y male w/ hx of sella mass diagnosed April 2020. Was seeing Neuro-ophtho clinic at [facility] and was sent into the hospital for worsening bitemporal hemianopsia likely 2/2 sella mass. Per MRI report from [place] on 4/13/2020 he has a 3.3x2.3x1.9cm sella lesion. Patient admits to worsening visual fields. Denies any other symptoms like headache, increased thirst, difficulty urinating etc. Pt is stable for discharge home, will return electively as an outpatient for resection of sella mass.

## 2020-07-23 PROBLEM — G45.9 TRANSIENT CEREBRAL ISCHEMIC ATTACK, UNSPECIFIED: Chronic | Status: ACTIVE | Noted: 2020-07-17

## 2020-07-23 PROBLEM — I10 ESSENTIAL (PRIMARY) HYPERTENSION: Chronic | Status: ACTIVE | Noted: 2020-07-17

## 2020-07-27 ENCOUNTER — RECORD ABSTRACTING (OUTPATIENT)
Age: 55
End: 2020-07-27

## 2020-07-28 ENCOUNTER — APPOINTMENT (OUTPATIENT)
Dept: NEUROSURGERY | Facility: CLINIC | Age: 55
End: 2020-07-28
Payer: MEDICAID

## 2020-07-28 DIAGNOSIS — Z00.00 ENCOUNTER FOR GENERAL ADULT MEDICAL EXAMINATION W/OUT ABNORMAL FINDINGS: ICD-10-CM

## 2020-07-28 PROCEDURE — 99213 OFFICE O/P EST LOW 20 MIN: CPT | Mod: 95

## 2020-07-30 ENCOUNTER — OUTPATIENT (OUTPATIENT)
Dept: OUTPATIENT SERVICES | Facility: HOSPITAL | Age: 55
LOS: 1 days | Discharge: HOME | End: 2020-07-30

## 2020-07-30 ENCOUNTER — APPOINTMENT (OUTPATIENT)
Dept: OTOLARYNGOLOGY | Facility: CLINIC | Age: 55
End: 2020-07-30
Payer: MEDICAID

## 2020-07-30 VITALS
WEIGHT: 230 LBS | SYSTOLIC BLOOD PRESSURE: 175 MMHG | BODY MASS INDEX: 31.15 KG/M2 | HEIGHT: 72 IN | DIASTOLIC BLOOD PRESSURE: 101 MMHG | HEART RATE: 57 BPM

## 2020-07-30 DIAGNOSIS — R93.0 ABNORMAL FINDINGS ON DIAGNOSTIC IMAGING OF SKULL AND HEAD, NOT ELSEWHERE CLASSIFIED: ICD-10-CM

## 2020-07-30 DIAGNOSIS — Z98.890 OTHER SPECIFIED POSTPROCEDURAL STATES: Chronic | ICD-10-CM

## 2020-07-30 DIAGNOSIS — Z98.52 VASECTOMY STATUS: Chronic | ICD-10-CM

## 2020-07-30 PROCEDURE — 92567 TYMPANOMETRY: CPT

## 2020-07-30 PROCEDURE — 92557 COMPREHENSIVE HEARING TEST: CPT

## 2020-07-30 PROCEDURE — 31231 NASAL ENDOSCOPY DX: CPT

## 2020-07-30 RX ORDER — TAMSULOSIN HYDROCHLORIDE 0.4 MG/1
0.4 CAPSULE ORAL
Qty: 30 | Refills: 0 | Status: ACTIVE | COMMUNITY
Start: 2020-07-23

## 2020-07-30 RX ORDER — CLOTRIMAZOLE 10 MG/G
1 CREAM TOPICAL
Qty: 1 | Refills: 0 | Status: ACTIVE | COMMUNITY
Start: 2020-07-30 | End: 1900-01-01

## 2020-07-30 NOTE — PHYSICAL EXAM
[Binocular Microscopic Exam] : Binocular microscopic exam was performed [Nasal Endoscopy Performed] : nasal endoscopy was performed, see procedure section for findings [] : septum deviated bilaterally [Midline] : trachea located in midline position [Laryngoscopy Performed] : laryngoscopy was performed, see procedure section for findings [Normal] : no mass and no adenopathy [FreeTextEntry8] : fungal and purulent debris, inflammation [FreeTextEntry9] : fungal and purulent debris, inflammation

## 2020-07-30 NOTE — HISTORY OF PRESENT ILLNESS
[de-identified] : 55M with pituitary macroadenoma referred by Dr. Trevor Briscoe in anticipation of endonasal resection\par Surgery planned for next week\par MRI/CT reviewed personally- demonstrates large sellar mass with suprasellar extension and optic compression, paranasal sinuses clear, septum midline\par Patient had presented to [facility] originally with progressive visual difficulty, was found to have field loss\par Denies sinonasal symptoms, hx of acute sinus infections\par \par Also reports bilateral ear fullness, tinnitus, otalgia-- started a few weeks ago and has continued\par Denies other otologic symptoms\par Has never had audio\par

## 2020-07-30 NOTE — REASON FOR VISIT
[Initial Consultation] : an initial consultation for [FreeTextEntry2] : referred by Dr. Trevor Briscoe for ear fullness

## 2020-07-30 NOTE — PROCEDURE
[Risk and Benefits Discussed] : The purpose, risks, discomforts, benefits and alternatives of the procedure have been explained to the patient including no treatment. [Same] : same as the Pre Op Dx. [] : Binocular Microscopy [FreeTextEntry6] : Fungal debris and purulence removed, TM intact\par Gentian violet, boric powder, clotrimazole applied

## 2020-08-02 DIAGNOSIS — Z01.818 ENCOUNTER FOR OTHER PREPROCEDURAL EXAMINATION: ICD-10-CM

## 2020-08-03 ENCOUNTER — APPOINTMENT (OUTPATIENT)
Dept: DISASTER EMERGENCY | Facility: CLINIC | Age: 55
End: 2020-08-03

## 2020-08-04 ENCOUNTER — APPOINTMENT (OUTPATIENT)
Dept: OTOLARYNGOLOGY | Facility: HOSPITAL | Age: 55
End: 2020-08-04

## 2020-08-04 ENCOUNTER — TRANSCRIPTION ENCOUNTER (OUTPATIENT)
Age: 55
End: 2020-08-04

## 2020-08-04 LAB — SARS-COV-2 N GENE NPH QL NAA+PROBE: NOT DETECTED

## 2020-08-04 NOTE — REASON FOR VISIT
[Home] : at home, [unfilled] , at the time of the visit. 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 Tibial and is poorly \par visualized. Mass effect upon the optic chiasm. No cavernous sinus mass. \par

## 2020-08-04 NOTE — REVIEW OF SYSTEMS
[As Noted in HPI] : as noted in HPI [Eyesight Problems] : eyesight problems [Negative] : Endocrine [Abdominal Pain] : no abdominal pain [Vomiting] : no vomiting [Diarrhea] : no diarrhea [Dysuria] : no dysuria [Skin Lesions] : no skin lesions [Easy Bleeding] : no tendency for easy bleeding [Easy Bruising] : no tendency for easy bruising

## 2020-08-04 NOTE — ASSESSMENT
[FreeTextEntry1] : Discussed surgical procedure to resect the sella mass today with patient and his family.  After discussion of the risks, benefits, and alternatives also discussed while inpatient, he wishes to proceed with the surgery.  \par \par Referral to ENT Dr. Ontiveros or first available provider, for ear fullness provided.\par \par 1)  Surgery scheduled for next week - endoscopic TSP

## 2020-08-04 NOTE — HISTORY OF PRESENT ILLNESS
[FreeTextEntry1] : Mr. Celaya is a 54 yo male with recent diagnosis of sella mass found on MRI 7/17/20.  He presented to [facility] with worsening bitemporal hemianopsia and sent to [facility] by neuro ophthalmology.  He was discharged and arrives for surgical discussion.  \par \par He is feeling fullness in both ears since starting Losartan, and pending PCP appointment (does not have a PCP yet, awaiting call back from [facility]).\par \par Denies headache, seizure, nausea, vomiting, fever, chest pain, palpitations, shortness of breath, visual, hearing, speech deficits.

## 2020-08-04 NOTE — RESULTS/DATA
[FreeTextEntry1] : [facility]\par    [facility] Department of Radiology\par   Radiology Report\par \par \par Patient Name: BIJU DESOUZA  Report Date: 19-Jul-2020 10:52.00 \par Patient ID: [id number] ([id number]), [id number] ([id number])  Accession No.: [id number] \par Patient Birth Date: 1965  Report Status: F \par Referring Physician: [id number] ANDREEA YOO   Reason For Study: sella lesion per nsx  \par \par \par \par \par \par \par \par EXAM: MR BRAIN WAW IC \par \par EXAM: MR SELLA ONLY WAW IC \par \par \par PROCEDURE DATE: Jul 18 2020 \par \par \par \par INTERPRETATION: Contrast-enhanced MRI of the brain. \par \par CLINICAL INDICATION: eval \par \par TECHNIQUE: Multiplanar, multisequence MR images of the brain were obtained \par before and after the intravenous menstruation of 9.8 cc of Gadavist. 0.2 cc \par were discarded. Special attention was paid to the sellar and suprasellar \par regions. \par \par COMPARISON: CT brain 7/17/2020 \par \par FINDINGS: \par \par No hydrocephalus, midline shift, vasogenic edema, acute intracranial \par hemorrhage, or acute infarction. Mild white matter microvascular ischemic \par disease. \par \par Signal voids are seen within the major intracranial vessels consistent with \par their patency. \par \par No abnormal parenchymal or leptomeningeal enhancement. \par \par Redemonstration of 2.3 x 2.6 x 3.1 cm (anterior-posterior by transverse by \par craniocaudad) avidly enhancing sellar and suprasellar mass inseparable from \par the pituitary gland, likely a pituitary macroadenoma. Tibial and is poorly \par visualized. Mass effect upon the optic chiasm. No cavernous sinus mass. \par \par Minimal left maxillary sinus mucosal thickening. Mastoid air cells clear. \par Orbits and craniocervical junction unremarkable. \par \par IMPRESSION: \par \par Probable pituitary macroadenoma as described. \par \par \par \par \par \par \par \par \par \par \par CHANEL WISEMAN M.D., ATTENDING RADIOLOGIST \par This document has been electronically signed. Jul 19 2020 10:52AM \par \par \par \par \par \par  \par

## 2020-08-05 ENCOUNTER — INPATIENT (INPATIENT)
Facility: HOSPITAL | Age: 55
LOS: 1 days | Discharge: HOME | End: 2020-08-07
Attending: STUDENT IN AN ORGANIZED HEALTH CARE EDUCATION/TRAINING PROGRAM | Admitting: STUDENT IN AN ORGANIZED HEALTH CARE EDUCATION/TRAINING PROGRAM
Payer: SELF-PAY

## 2020-08-05 ENCOUNTER — RESULT REVIEW (OUTPATIENT)
Age: 55
End: 2020-08-05

## 2020-08-05 ENCOUNTER — APPOINTMENT (OUTPATIENT)
Dept: OTOLARYNGOLOGY | Facility: HOSPITAL | Age: 55
End: 2020-08-05

## 2020-08-05 ENCOUNTER — APPOINTMENT (OUTPATIENT)
Dept: NEUROSURGERY | Facility: HOSPITAL | Age: 55
End: 2020-08-05

## 2020-08-05 VITALS
DIASTOLIC BLOOD PRESSURE: 84 MMHG | OXYGEN SATURATION: 100 % | WEIGHT: 219.58 LBS | TEMPERATURE: 99 F | HEIGHT: 72 IN | SYSTOLIC BLOOD PRESSURE: 141 MMHG | HEART RATE: 58 BPM | RESPIRATION RATE: 16 BRPM

## 2020-08-05 DIAGNOSIS — D35.2 BENIGN NEOPLASM OF PITUITARY GLAND: ICD-10-CM

## 2020-08-05 DIAGNOSIS — E23.0 HYPOPITUITARISM: ICD-10-CM

## 2020-08-05 DIAGNOSIS — E27.49 OTHER ADRENOCORTICAL INSUFFICIENCY: ICD-10-CM

## 2020-08-05 DIAGNOSIS — Z98.52 VASECTOMY STATUS: Chronic | ICD-10-CM

## 2020-08-05 DIAGNOSIS — Z98.890 OTHER SPECIFIED POSTPROCEDURAL STATES: Chronic | ICD-10-CM

## 2020-08-05 LAB
ANION GAP SERPL CALC-SCNC: 18 MMO/L — HIGH (ref 7–14)
ANION GAP SERPL CALC-SCNC: 18 MMO/L — HIGH (ref 7–14)
BLD GP AB SCN SERPL QL: NEGATIVE — SIGNIFICANT CHANGE UP
BUN SERPL-MCNC: 13 MG/DL — SIGNIFICANT CHANGE UP (ref 7–23)
BUN SERPL-MCNC: 19 MG/DL — SIGNIFICANT CHANGE UP (ref 7–23)
CALCIUM SERPL-MCNC: 8.6 MG/DL — SIGNIFICANT CHANGE UP (ref 8.4–10.5)
CALCIUM SERPL-MCNC: 8.9 MG/DL — SIGNIFICANT CHANGE UP (ref 8.4–10.5)
CHLORIDE SERPL-SCNC: 100 MMOL/L — SIGNIFICANT CHANGE UP (ref 98–107)
CHLORIDE SERPL-SCNC: 97 MMOL/L — LOW (ref 98–107)
CO2 SERPL-SCNC: 21 MMOL/L — LOW (ref 22–31)
CO2 SERPL-SCNC: 22 MMOL/L — SIGNIFICANT CHANGE UP (ref 22–31)
CREAT SERPL-MCNC: 1.08 MG/DL — SIGNIFICANT CHANGE UP (ref 0.5–1.3)
CREAT SERPL-MCNC: 1.38 MG/DL — HIGH (ref 0.5–1.3)
GLUCOSE SERPL-MCNC: 141 MG/DL — HIGH (ref 70–99)
GLUCOSE SERPL-MCNC: 171 MG/DL — HIGH (ref 70–99)
HCT VFR BLD CALC: 43.4 % — SIGNIFICANT CHANGE UP (ref 39–50)
HGB BLD-MCNC: 13.8 G/DL — SIGNIFICANT CHANGE UP (ref 13–17)
MCHC RBC-ENTMCNC: 28.3 PG — SIGNIFICANT CHANGE UP (ref 27–34)
MCHC RBC-ENTMCNC: 31.8 % — LOW (ref 32–36)
MCV RBC AUTO: 88.9 FL — SIGNIFICANT CHANGE UP (ref 80–100)
NRBC # FLD: 0 K/UL — SIGNIFICANT CHANGE UP (ref 0–0)
OSMOLALITY UR: 545 MOSMO/KG — SIGNIFICANT CHANGE UP (ref 50–1200)
PLATELET # BLD AUTO: 162 K/UL — SIGNIFICANT CHANGE UP (ref 150–400)
PMV BLD: 11.7 FL — SIGNIFICANT CHANGE UP (ref 7–13)
POTASSIUM SERPL-MCNC: 3.8 MMOL/L — SIGNIFICANT CHANGE UP (ref 3.5–5.3)
POTASSIUM SERPL-MCNC: 5.3 MMOL/L — SIGNIFICANT CHANGE UP (ref 3.5–5.3)
POTASSIUM SERPL-SCNC: 3.8 MMOL/L — SIGNIFICANT CHANGE UP (ref 3.5–5.3)
POTASSIUM SERPL-SCNC: 5.3 MMOL/L — SIGNIFICANT CHANGE UP (ref 3.5–5.3)
RBC # BLD: 4.88 M/UL — SIGNIFICANT CHANGE UP (ref 4.2–5.8)
RBC # FLD: 12.2 % — SIGNIFICANT CHANGE UP (ref 10.3–14.5)
RH IG SCN BLD-IMP: POSITIVE — SIGNIFICANT CHANGE UP
SODIUM SERPL-SCNC: 136 MMOL/L — SIGNIFICANT CHANGE UP (ref 135–145)
SODIUM SERPL-SCNC: 140 MMOL/L — SIGNIFICANT CHANGE UP (ref 135–145)
WBC # BLD: 8.73 K/UL — SIGNIFICANT CHANGE UP (ref 3.8–10.5)
WBC # FLD AUTO: 8.73 K/UL — SIGNIFICANT CHANGE UP (ref 3.8–10.5)

## 2020-08-05 PROCEDURE — 62165 REMOVE PITUIT TUMOR W/SCOPE: CPT | Mod: 62

## 2020-08-05 PROCEDURE — 88305 TISSUE EXAM BY PATHOLOGIST: CPT | Mod: 26

## 2020-08-05 PROCEDURE — 88360 TUMOR IMMUNOHISTOCHEM/MANUAL: CPT | Mod: 26

## 2020-08-05 PROCEDURE — 88342 IMHCHEM/IMCYTCHM 1ST ANTB: CPT | Mod: 26,59

## 2020-08-05 PROCEDURE — 99255 IP/OBS CONSLTJ NEW/EST HI 80: CPT | Mod: GC

## 2020-08-05 PROCEDURE — 99253 IP/OBS CNSLTJ NEW/EST LOW 45: CPT

## 2020-08-05 PROCEDURE — 61782 SCAN PROC CRANIAL EXTRA: CPT

## 2020-08-05 PROCEDURE — 62272 THER SPI PNXR DRG CSF: CPT

## 2020-08-05 PROCEDURE — 88341 IMHCHEM/IMCYTCHM EA ADD ANTB: CPT | Mod: 26,59

## 2020-08-05 PROCEDURE — 61781 SCAN PROC CRANIAL INTRA: CPT

## 2020-08-05 RX ORDER — MORPHINE SULFATE 50 MG/1
2 CAPSULE, EXTENDED RELEASE ORAL ONCE
Refills: 0 | Status: DISCONTINUED | OUTPATIENT
Start: 2020-08-05 | End: 2020-08-05

## 2020-08-05 RX ORDER — LABETALOL HCL 100 MG
5 TABLET ORAL ONCE
Refills: 0 | Status: COMPLETED | OUTPATIENT
Start: 2020-08-05 | End: 2020-08-05

## 2020-08-05 RX ORDER — HYDROMORPHONE HYDROCHLORIDE 2 MG/ML
1 INJECTION INTRAMUSCULAR; INTRAVENOUS; SUBCUTANEOUS ONCE
Refills: 0 | Status: DISCONTINUED | OUTPATIENT
Start: 2020-08-05 | End: 2020-08-05

## 2020-08-05 RX ORDER — ONDANSETRON 8 MG/1
4 TABLET, FILM COATED ORAL EVERY 6 HOURS
Refills: 0 | Status: DISCONTINUED | OUTPATIENT
Start: 2020-08-05 | End: 2020-08-07

## 2020-08-05 RX ORDER — ACETAMINOPHEN 500 MG
1000 TABLET ORAL ONCE
Refills: 0 | Status: COMPLETED | OUTPATIENT
Start: 2020-08-05 | End: 2020-08-05

## 2020-08-05 RX ORDER — ACETAMINOPHEN 500 MG
1000 TABLET ORAL EVERY 8 HOURS
Refills: 0 | Status: COMPLETED | OUTPATIENT
Start: 2020-08-05 | End: 2020-08-05

## 2020-08-05 RX ORDER — SODIUM CHLORIDE 9 MG/ML
1000 INJECTION, SOLUTION INTRAVENOUS
Refills: 0 | Status: DISCONTINUED | OUTPATIENT
Start: 2020-08-05 | End: 2020-08-06

## 2020-08-05 RX ORDER — ACETAMINOPHEN 500 MG
650 TABLET ORAL EVERY 6 HOURS
Refills: 0 | Status: DISCONTINUED | OUTPATIENT
Start: 2020-08-05 | End: 2020-08-05

## 2020-08-05 RX ORDER — SENNA PLUS 8.6 MG/1
2 TABLET ORAL AT BEDTIME
Refills: 0 | Status: DISCONTINUED | OUTPATIENT
Start: 2020-08-05 | End: 2020-08-07

## 2020-08-05 RX ORDER — LISINOPRIL 2.5 MG/1
20 TABLET ORAL DAILY
Refills: 0 | Status: DISCONTINUED | OUTPATIENT
Start: 2020-08-05 | End: 2020-08-07

## 2020-08-05 RX ORDER — LABETALOL HCL 100 MG
10 TABLET ORAL ONCE
Refills: 0 | Status: COMPLETED | OUTPATIENT
Start: 2020-08-05 | End: 2020-08-05

## 2020-08-05 RX ORDER — KETOROLAC TROMETHAMINE 30 MG/ML
15 SYRINGE (ML) INJECTION ONCE
Refills: 0 | Status: DISCONTINUED | OUTPATIENT
Start: 2020-08-05 | End: 2020-08-05

## 2020-08-05 RX ORDER — AMLODIPINE BESYLATE 2.5 MG/1
10 TABLET ORAL DAILY
Refills: 0 | Status: DISCONTINUED | OUTPATIENT
Start: 2020-08-05 | End: 2020-08-07

## 2020-08-05 RX ORDER — CHLORHEXIDINE GLUCONATE 213 G/1000ML
1 SOLUTION TOPICAL
Refills: 0 | Status: DISCONTINUED | OUTPATIENT
Start: 2020-08-05 | End: 2020-08-07

## 2020-08-05 RX ORDER — CEFAZOLIN SODIUM 1 G
2000 VIAL (EA) INJECTION EVERY 8 HOURS
Refills: 0 | Status: COMPLETED | OUTPATIENT
Start: 2020-08-05 | End: 2020-08-05

## 2020-08-05 RX ADMIN — SODIUM CHLORIDE 75 MILLILITER(S): 9 INJECTION, SOLUTION INTRAVENOUS at 23:41

## 2020-08-05 RX ADMIN — Medication 100 MILLIGRAM(S): at 15:26

## 2020-08-05 RX ADMIN — Medication 5 MILLIGRAM(S): at 23:36

## 2020-08-05 RX ADMIN — Medication 1000 MILLIGRAM(S): at 18:15

## 2020-08-05 RX ADMIN — Medication 10 MILLIGRAM(S): at 18:48

## 2020-08-05 RX ADMIN — HYDROMORPHONE HYDROCHLORIDE 1 MILLIGRAM(S): 2 INJECTION INTRAMUSCULAR; INTRAVENOUS; SUBCUTANEOUS at 15:35

## 2020-08-05 RX ADMIN — MORPHINE SULFATE 2 MILLIGRAM(S): 50 CAPSULE, EXTENDED RELEASE ORAL at 13:49

## 2020-08-05 RX ADMIN — Medication 400 MILLIGRAM(S): at 18:00

## 2020-08-05 RX ADMIN — MORPHINE SULFATE 2 MILLIGRAM(S): 50 CAPSULE, EXTENDED RELEASE ORAL at 14:39

## 2020-08-05 RX ADMIN — SENNA PLUS 2 TABLET(S): 8.6 TABLET ORAL at 21:01

## 2020-08-05 RX ADMIN — MORPHINE SULFATE 2 MILLIGRAM(S): 50 CAPSULE, EXTENDED RELEASE ORAL at 14:24

## 2020-08-05 RX ADMIN — HYDROMORPHONE HYDROCHLORIDE 1 MILLIGRAM(S): 2 INJECTION INTRAMUSCULAR; INTRAVENOUS; SUBCUTANEOUS at 15:25

## 2020-08-05 RX ADMIN — Medication 100 MILLIGRAM(S): at 21:01

## 2020-08-05 RX ADMIN — ONDANSETRON 4 MILLIGRAM(S): 8 TABLET, FILM COATED ORAL at 23:53

## 2020-08-05 RX ADMIN — MORPHINE SULFATE 2 MILLIGRAM(S): 50 CAPSULE, EXTENDED RELEASE ORAL at 14:04

## 2020-08-05 RX ADMIN — AMLODIPINE BESYLATE 10 MILLIGRAM(S): 2.5 TABLET ORAL at 20:58

## 2020-08-05 RX ADMIN — Medication 400 MILLIGRAM(S): at 23:35

## 2020-08-05 NOTE — CONSULT NOTE ADULT - SUBJECTIVE AND OBJECTIVE BOX
HISTORY OF PRESENT ILLNESS:  55yom; Hx: HTN recently admitted to [facility] with worsening pituitary adenoma w/ visual changes. Presents from OR for post-operative management and neurological checks. Pt is s/p a Transphenoidal resection of a pituitary macroadenoma.     PAST MEDICAL HISTORY: TIA (transient ischemic attack)  HTN (hypertension)      PAST SURGICAL HISTORY: H/O vasectomy  H/O circumcision  No significant past surgical history      FAMILY HISTORY:     SOCIAL HISTORY:    CODE STATUS:     HOME MEDICATIONS:    ALLERGIES: ciprofloxacin (Nausea; Diarrhea)  losartan (Other)  Milk (Unknown)  NSAID (Unknown)      VITAL SIGNS:  ICU Vital Signs Last 24 Hrs  T(C): 37.2 (05 Aug 2020 06:41), Max: 37.2 (05 Aug 2020 06:41)  T(F): 98.9 (05 Aug 2020 06:41), Max: 98.9 (05 Aug 2020 06:41)  HR: 58 (05 Aug 2020 06:41) (58 - 58)  BP: 141/84 (05 Aug 2020 06:41) (141/84 - 141/84)  BP(mean): --  ABP: --  ABP(mean): --  RR: 16 (05 Aug 2020 06:41) (16 - 16)  SpO2: 100% (05 Aug 2020 06:41) (100% - 100%)      NEURO  Exam:  acetaminophen   Tablet .. 650 milliGRAM(s) Oral every 6 hours PRN Temp greater or equal to 38C (100.4F), Mild Pain (1 - 3)  ondansetron Injectable 4 milliGRAM(s) IV Push every 6 hours PRN Nausea      RESPIRATORY  Mechanical Ventilation:     Exam:      CARDIOVASCULAR    Exam: RRR, No MRG  Cardiac Rhythm: NSR      GI/NUTRITION  Exam: Soft, NT, ND  Diet: NPO in post-operative setting  senna 2 Tablet(s) Oral at bedtime      GENITOURINARY/RENAL  lactated ringers. 1000 milliLiter(s) IV Continuous <Continuous>  multivitamin 1 Tablet(s) Oral daily      Weight (kg): 99.6 (08-05 @ 06:41)        [ X ] Torres catheter, indication: urine output monitoring in critically ill patient    HEMATOLOGIC  [  ] VTE Prophylaxis: Held in immediate post-operative setting        Transfusion: [ ] PRBC	[ ] Platelets	[ ] FFP	[ ] Cryoprecipitate      INFECTIOUS DISEASES  ceFAZolin   IVPB 2000 milliGRAM(s) IV Intermittent every 8 hours    RECENT CULTURES:      ENDOCRINE    CAPILLARY BLOOD GLUCOSE          PATIENT CARE ACCESS DEVICES:  [ X ] Peripheral IV  [ ] Central Venous Line	[ ] R	[ ] L	[ ] IJ	[ ] Fem	[ ] SC	Placed:   [ ] Arterial Line		[ ] R	[ ] L	[ ] Fem	[ ] Rad	[ ] Ax	Placed:   [ ] PICC:					[ ] Mediport  [ ] Urinary Catheter, Date Placed:   [x] Necessity of urinary, arterial, and venous catheters discussed    OTHER MEDICATIONS:     IMAGING STUDIES: HISTORY OF PRESENT ILLNESS:  55yom; Hx: HTN recently admitted to [facility] with worsening pituitary adenoma w/ visual changes. Presents from OR for post-operative management and neurological checks. Pt is s/p a Transphenoidal resection of a pituitary macroadenoma on 8/5 joint case w/ ENT for approach. Lumbar drain was placed prophylactically during surgery for concern of CSF leak, but no leak was encountered so drain was removed intraop. EBL 200cc, UOP 100cc, and 1200 crystalloid given during case. SICU is being q1 hour neurochecks following surgery. No imaging requested by team postop.     PAST MEDICAL HISTORY: TIA (transient ischemic attack)  HTN (hypertension)      PAST SURGICAL HISTORY: H/O vasectomy  H/O circumcision  No significant past surgical history      FAMILY HISTORY:     SOCIAL HISTORY:    CODE STATUS:     HOME MEDICATIONS:    ALLERGIES: ciprofloxacin (Nausea; Diarrhea)  losartan (Other)  Milk (Unknown)  NSAID (Unknown)      VITAL SIGNS:  ICU Vital Signs Last 24 Hrs  T(C): 37.2 (05 Aug 2020 06:41), Max: 37.2 (05 Aug 2020 06:41)  T(F): 98.9 (05 Aug 2020 06:41), Max: 98.9 (05 Aug 2020 06:41)  HR: 58 (05 Aug 2020 06:41) (58 - 58)  BP: 141/84 (05 Aug 2020 06:41) (141/84 - 141/84)  BP(mean): --  ABP: --  ABP(mean): --  RR: 16 (05 Aug 2020 06:41) (16 - 16)  SpO2: 100% (05 Aug 2020 06:41) (100% - 100%)      NEURO  Exam: awake and responds to commands.   acetaminophen   Tablet .. 650 milliGRAM(s) Oral every 6 hours PRN Temp greater or equal to 38C (100.4F), Mild Pain (1 - 3)  ondansetron Injectable 4 milliGRAM(s) IV Push every 6 hours PRN Nausea      RESPIRATORY  breathing w/ supplemental O2 from face mask.     Exam:      CARDIOVASCULAR    Exam: RRR, No MRG  Cardiac Rhythm: NSR      GI/NUTRITION  Exam: Soft, NT, ND  Diet: NPO in post-operative setting  senna 2 Tablet(s) Oral at bedtime      GENITOURINARY/RENAL  lactated ringers. 1000 milliLiter(s) IV Continuous <Continuous>  multivitamin 1 Tablet(s) Oral daily      Weight (kg): 99.6 (08-05 @ 06:41)        [ X ] Torres catheter, indication: urine output monitoring in critically ill patient    HEMATOLOGIC  [  ] VTE Prophylaxis: Held in immediate post-operative setting        Transfusion: [ ] PRBC	[ ] Platelets	[ ] FFP	[ ] Cryoprecipitate      INFECTIOUS DISEASES  ceFAZolin   IVPB 2000 milliGRAM(s) IV Intermittent every 8 hours    RECENT CULTURES:      ENDOCRINE    CAPILLARY BLOOD GLUCOSE          PATIENT CARE ACCESS DEVICES:  [ X ] Peripheral IV  [ ] Central Venous Line	[ ] R	[ ] L	[ ] IJ	[ ] Fem	[ ] SC	Placed:   [ ] Arterial Line		[ ] R	[ ] L	[ ] Fem	[ ] Rad	[ ] Ax	Placed:   [ ] PICC:					[ ] Mediport  [ ] Urinary Catheter, Date Placed:   [x] Necessity of urinary, arterial, and venous catheters discussed    OTHER MEDICATIONS:     IMAGING STUDIES: HISTORY OF PRESENT ILLNESS:  55yom; Hx: HTN recently admitted to [facility] with worsening pituitary adenoma w/ visual changes. Presents from OR for post-operative management and neurological checks. Pt is s/p a Transphenoidal resection of a pituitary macroadenoma on 8/5 joint case w/ ENT for approach. Lumbar drain was placed prophylactically during surgery for concern of CSF leak, but no leak was encountered so drain was removed intraop. EBL 200cc, UOP 100cc, and 1200 crystalloid given during case. SICU is being q1 hour neurochecks following surgery. No imaging requested by team postop.     PAST MEDICAL HISTORY: TIA (transient ischemic attack)  HTN (hypertension)      PAST SURGICAL HISTORY: H/O vasectomy  H/O circumcision  No significant past surgical history      FAMILY HISTORY:     SOCIAL HISTORY:    CODE STATUS:     HOME MEDICATIONS:    ALLERGIES: ciprofloxacin (Nausea; Diarrhea)  losartan (Other)  Milk (Unknown)  NSAID (Unknown)      VITAL SIGNS:  ICU Vital Signs Last 24 Hrs  T(C): 37.2 (05 Aug 2020 06:41), Max: 37.2 (05 Aug 2020 06:41)  T(F): 98.9 (05 Aug 2020 06:41), Max: 98.9 (05 Aug 2020 06:41)  HR: 58 (05 Aug 2020 06:41) (58 - 58)  BP: 141/84 (05 Aug 2020 06:41) (141/84 - 141/84)  BP(mean): --  ABP: --  ABP(mean): --  RR: 16 (05 Aug 2020 06:41) (16 - 16)  SpO2: 100% (05 Aug 2020 06:41) (100% - 100%)      NEURO  Exam: awake and responds to commands.   acetaminophen   Tablet .. 650 milliGRAM(s) Oral every 6 hours PRN Temp greater or equal to 38C (100.4F), Mild Pain (1 - 3)  ondansetron Injectable 4 milliGRAM(s) IV Push every 6 hours PRN Nausea      RESPIRATORY  breathing w/ supplemental O2 from face mask.     Exam:      CARDIOVASCULAR    Exam: RRR, No MRG  Cardiac Rhythm: NSR      GI/NUTRITION  Exam: Soft, NT, ND  Diet: CLDin post-operative setting  senna 2 Tablet(s) Oral at bedtime      GENITOURINARY/RENAL  lactated ringers. 1000 milliLiter(s) IV Continuous <Continuous>  multivitamin 1 Tablet(s) Oral daily      Weight (kg): 99.6 (08-05 @ 06:41)        [ X ] Torres catheter, indication: urine output monitoring in critically ill patient    HEMATOLOGIC  [ x] VTE Prophylaxis: Held in immediate post-operative setting        Transfusion: [ ] PRBC	[ ] Platelets	[ ] FFP	[ ] Cryoprecipitate      INFECTIOUS DISEASES  ceFAZolin   IVPB 2000 milliGRAM(s) IV Intermittent every 8 hours    RECENT CULTURES:      ENDOCRINE    CAPILLARY BLOOD GLUCOSE          PATIENT CARE ACCESS DEVICES:  [ X ] Peripheral IV  [ ] Central Venous Line	[ ] R	[ ] L	[ ] IJ	[ ] Fem	[ ] SC	Placed:   [ ] Arterial Line		[ ] R	[ ] L	[ ] Fem	[ ] Rad	[ ] Ax	Placed:   [ ] PICC:					[ ] Mediport  [ ] Urinary Catheter, Date Placed:   [x] Necessity of urinary, arterial, and venous catheters discussed    OTHER MEDICATIONS:     IMAGING STUDIES:

## 2020-08-05 NOTE — BRIEF OPERATIVE NOTE - NSICDXBRIEFPROCEDURE_GEN_ALL_CORE_FT
PROCEDURES:  Endoscopic transphenoidal or transnasal resection of pituitary tumor 05-Aug-2020 12:28:17  Raulito Miller

## 2020-08-05 NOTE — CONSULT NOTE ADULT - PROBLEM SELECTOR RECOMMENDATION 3
Prior low testosterone on outside workup and also inappropriately low LH, FSH  recheck as outpatient with endocrine to determine need for testosterone therapy.

## 2020-08-05 NOTE — PROGRESS NOTE PEDS - SUBJECTIVE AND OBJECTIVE BOX
Post op Note    Dx:    55y    Male   s/p endoscopic transsphenoidal resection of pituitary lesion. Doing well in ICU, neuro checks have been stable.       T(C): 36.4 (08-05-20 @ 13:45), Max: 37.2 (08-05-20 @ 06:41)  HR: 78 (08-05-20 @ 15:30) (58 - 92)  BP: 166/89 (08-05-20 @ 15:30) (141/84 - 171/98)  RR: 15 (08-05-20 @ 15:30) (14 - 17)  SpO2: 96% (08-05-20 @ 15:30) (92% - 100%)      Physical exam  Awakens to voice sleepy (got pain meds prior to exam), PERRL, EOMI  Follows commands  YIN X4 with good strength   Incision: C/D/I

## 2020-08-05 NOTE — CONSULT NOTE ADULT - SUBJECTIVE AND OBJECTIVE BOX
HPI:  55y male with PMhx of HTN and pituitary macroadenoma s/p TSR of pituitary macroadenoma (8/5/20).    Endocrine History:  Patient noted to have pituitary macroadenoma on MRI after presenting to [facility] in July 2020  Imaging showed a 2 X  2.6 X 3 cm suprasellar mass with mass effect.  Patient was experiencing difficulty with vision that was worsening which prompted the scheduling of the TSR.    Patient was very groggy at bedside today after receiving pain medications so was not able to provide a good history.  Unable to obtain any further endocrine history other than what is noted above.    Prior Pituitary Labs (7/2020)  TSH 2.02  T4 4.41  Prolactin 36.5  LH 3.7  FSH 3.5  Testosterone (04/2020) 203  ACTH 29.7  Cortisol 4.1  IGF-1 98    Interval Inpatient History:  Per nurse patient is doing well. However is reporting increased thirst. Also noted to have ~675cc urine output over 2 hours.  Plan to transition to CLD    PAST MEDICAL & SURGICAL HISTORY:  TIA (transient ischemic attack)  HTN (hypertension)  H/O vasectomy: 2006  H/O circumcision: 2014    FAMILY HISTORY:  Unable to obtain    Social History:  Unable to obtain    Outpatient Medications:  - Losartan and HCTZ    MEDICATIONS  (STANDING):  ceFAZolin   IVPB 2000 milliGRAM(s) IV Intermittent every 8 hours  chlorhexidine 4% Liquid 1 Application(s) Topical <User Schedule>  lactated ringers. 1000 milliLiter(s) (75 mL/Hr) IV Continuous <Continuous>  multivitamin 1 Tablet(s) Oral daily  senna 2 Tablet(s) Oral at bedtime    MEDICATIONS  (PRN):  ondansetron Injectable 4 milliGRAM(s) IV Push every 6 hours PRN Nausea    Allergies  ciprofloxacin (Nausea; Diarrhea)  losartan (Other)    Intolerances  Milk (Unknown)    Review of Systems:  UNABLE TO OBTAIN    PHYSICAL EXAM:  VITALS: T(C): 36.3 (08-05-20 @ 16:00)  T(F): 97.3 (08-05-20 @ 16:00), Max: 98.9 (08-05-20 @ 06:41)  HR: 70 (08-05-20 @ 17:00) (58 - 92)  BP: 162/89 (08-05-20 @ 17:00) (141/84 - 171/98)  RR:  (14 - 17)  SpO2:  (92% - 100%)  Wt(kg): 99.6  GENERAL: NAD, Groggy, falling asleep during exam  EYES: No proptosis, anicteric  HEENT:  Atraumatic, Normocephalic, moist mucous membranes  THYROID: Normal size, no palpable nodules  RESPIRATORY: Clear to auscultation bilaterally in anterior lung fields; No rales, rhonchi, wheezing  CARDIOVASCULAR: Regular rate and rhythm; No murmurs appreciated; no peripheral edema  GI: Soft, nontender, non distended, normal bowel sounds  SKIN: Dry, intact, No rashes or lesions  MUSCULOSKELETAL: Moving all extremities   PSYCH: Groggy, Falling asleep during exam, not answering questions                          13.8   8.73  )-----------( 162      ( 05 Aug 2020 14:20 )             43.4       08-05    140  |  100  |  19  ----------------------------<  141<H>  3.8   |  22  |  1.38<H>    EGFR if : 66  EGFR if non : 57    Ca    8.9      08-05      Thyroid Function Tests:  07-17 @ 12:26 TSH 2.02 FreeT4 -- T3 94.2 Anti TPO -- Anti Thyroglobulin Ab -- TSI --

## 2020-08-05 NOTE — CONSULT NOTE ADULT - ATTENDING COMMENTS
55M s/p transsphenoidal resection pituitary macroadenoma. Monitor for DI tonight as initial urine output is elevated. Repeat sodium and will determine if DDAVP is needed. Check AM cortisol as previously noted to be decreased.  Will follow. Complex patient high level decision making.    Tripp Bui MD  Division of Endocrinology  Pager: [phone]    If after 6PM or before 9AM, or on weekends/holidays, please call endocrine answering service for assistance ([phone]).  For nonurgent matters email [email or web address] for assistance.
Agree with notes of health care providers on my service (PAs, residents and House Staff).  The patient is a patient with hemodynamic and metabolic instability being consulted for SICU care.   I have personally discussed with other consultants, House staff and PA's.   These diagnoses are unrelated to the surgical procedure noted above.  55yom; Hx: HTN recently admitted to [facility] with worsening pituitary adenoma w/ visual changes. Presents from OR for post-operative management and neurological checks. Pt is s/p a Transphenoidal resection of a pituitary macroadenoma, here in SICU for post-operative q 1 hour neuro checks.   I have personally examined the patient, reviewed data and laboratory tests/x-rays and all pertinent electronic images.  NEURO  Exam: no focal deficits  RESPIRATORY  Exam:  Clear  CARDIOVASCULAR  Exam: RRR, No MRG  Cardiac Rhythm: NSR  GI/NUTRITION  Exam: Soft, NT, ND  Diet: NPO   The assessment and plan is specified.  PLAN:    Neuro: Pituitary Macreoadenoma  - S/P TSP Resection 8/5/20 POD: 0  - q 1 Hour Neuro Checks  - Lumbar Drain in place    Respiratory: No active respiratory issues  - Saturating well on RA    CV: No active cardiovascular issues  - HD stable, without pressor requirements  - EBL intra-operatively: 100 ml    GI:  - NPO in immediate post-operative setting  - Advance diet as recommended by ENT    :   - No active renal issues  - Strict I/O's  - CTM Electrolytes    Heme:   - Hemodynamically stable  - Minimal intra-operative blood loss    Vasc:  - DVT Prophylaxis held in immediate post-operative setting    ID:  - Ancef intra-operatively  - Monitor fever curve    DISPO: SICU for q 1 hour neuro checks.

## 2020-08-05 NOTE — H&P ADULT - HISTORY OF PRESENT ILLNESS
55y recently admitted to [facility] with worsening pituitary adenoma w/ visual changes. Here for PRE OP for TSP w/ ENT.

## 2020-08-05 NOTE — H&P ADULT - NSHPLABSRESULTS_GEN_ALL_CORE
< from: MR Head w/wo IV Cont (07.18.20 @ 23:30) >      CLINICAL INDICATION: eval    TECHNIQUE:  Multiplanar, multisequence MR images of the brain were obtained before and after the intravenous menstruation of 9.8 cc of Gadavist. 0.2 cc were discarded. Special attention was paid to the sellar and suprasellar regions.    COMPARISON: CT brain 7/17/2020    FINDINGS:      No hydrocephalus, midline shift, vasogenic edema, acute intracranial hemorrhage, or acute infarction. Mild white matter microvascular ischemic disease.     Signal voids are seen within the major intracranial vessels consistent with their patency.    No abnormal parenchymal or leptomeningeal enhancement.    Redemonstration of 2.3 x 2.6 x 3.1 cm (anterior-posterior by transverse by craniocaudad) avidly enhancing sellar and suprasellar mass inseparable from the pituitary gland, likely a pituitary macroadenoma. Tibial and is poorly visualized. Mass effect upon the optic chiasm. No cavernous sinus mass.    Minimal left maxillary sinus mucosal thickening. Mastoid air cells clear. Orbits and craniocervical junction unremarkable.    IMPRESSION:    Probable pituitary macroadenomaas described.    < end of copied text >

## 2020-08-05 NOTE — CHART NOTE - NSCHARTNOTEFT_GEN_A_CORE
CAPRINI SCORE [CLOT]    AGE RELATED RISK FACTORS                                                       MOBILITY RELATED FACTORS  [x] Age 41-60 years                                            (1 Point)                  [ ] Bed rest                                                        (1 Point)  [ ] Age: 61-74 years                                           (2 Points)                 [ ] Plaster cast                                                   (2 Points)  [ ] Age= 75 years                                              (3 Points)                 [ ] Bed bound for more than 72 hours                 (2 Points)    DISEASE RELATED RISK FACTORS                                               GENDER SPECIFIC FACTORS  [ ] Edema in the lower extremities                       (1 Point)                  [ ] Pregnancy                                                     (1 Point)  [ ] Varicose veins                                               (1 Point)                  [ ] Post-partum < 6 weeks                                   (1 Point)             [x] BMI > 25 Kg/m2                                            (1 Point)                  [ ] Hormonal therapy  or oral contraception          (1 Point)                 [ ] Sepsis (in the previous month)                        (1 Point)                  [ ] History of pregnancy complications                 (1 point)  [ ] Pneumonia or serious lung disease                                               [ ] Unexplained or recurrent                     (1 Point)           (in the previous month)                               (1 Point)  [ ] Abnormal pulmonary function test                     (1 Point)                 SURGERY RELATED RISK FACTORS  [ ] Acute myocardial infarction                              (1 Point)                 [ ]  Section                                             (1 Point)  [ ] Congestive heart failure (in the previous month)  (1 Point)               [ ] Minor surgery                                                  (1 Point)   [ ] Inflammatory bowel disease                             (1 Point)                 [ ] Arthroscopic surgery                                        (2 Points)  [ ] Central venous access                                      (2 Points)                [x] General surgery lasting more than 45 minutes   (2 Points)       [ ] Stroke (in the previous month)                          (5 Points)               [ ] Elective arthroplasty                                         (5 Points)                                                                                                                                               HEMATOLOGY RELATED FACTORS                                                 TRAUMA RELATED RISK FACTORS  [ ] Prior episodes of VTE                                     (3 Points)                [ ] Fracture of the hip, pelvis, or leg                       (5 Points)  [ ] Positive family history for VTE                         (3 Points)                 [ ] Acute spinal cord injury (in the previous month)  (5 Points)  [ ] Prothrombin 86512 A                                     (3 Points)                 [ ] Paralysis  (less than 1 month)                             (5 Points)  [ ] Factor V Leiden                                             (3 Points)                  [ ] Multiple Trauma within 1 month                        (5 Points)  [ ] Lupus anticoagulants                                     (3 Points)                                                           [ ] Anticardiolipin antibodies                               (3 Points)                                                       [ ] High homocysteine in the blood                      (3 Points)                                             [ ] Other congenital or acquired thrombophilia      (3 Points)                                                [ ] Heparin induced thrombocytopenia                  (3 Points)                                          Total Score [  4   ]    Caprini Score 0 - 2:  Low Risk, No VTE Prophylaxis required for most patients, encourage ambulation  Caprini Score 3 - 6:  At Risk, pharmacologic VTE prophylaxis is indicated for most patients (in the absence of a contraindication)  Caprini Score Greater than or = 7:  High Risk, pharmacologic VTE prophylaxis is indicated for most patients (in the absence of a contraindication)

## 2020-08-05 NOTE — PROGRESS NOTE PEDS - ASSESSMENT
55y male s/p TSP for pituitary adenoma  -Continue q1 hr vitals/ DI watch  -Please consult Endocrine for post op adenoma care/ DI watch management  -Sodium checks   -NO post op imaging required at this time

## 2020-08-05 NOTE — CONSULT NOTE ADULT - ASSESSMENT
55yom; Hx: HTN recently admitted to [facility] with worsening pituitary adenoma w/ visual changes. Presents from OR for post-operative management and neurological checks. Pt is s/p a Transphenoidal resection of a pituitary macroadenoma, here in SICU for post-operative q 1 hour neuro checks.     PLAN:    Neuro: Pituitary Macreoadenoma  - S/P TSP Resection 8/5/20 POD: 0  - q 1 Hour Neuro Checks  - Lumbar Drain in place    Respiratory: No active respiratory issues  - Saturating well on RA    CV: No active cardiovascular issues  - HD stable, without pressor requirements  - EBL intra-operatively: 100 ml    GI:  - NPO in immediate post-operative setting  - Advance diet as recommended by ENT    :   - No active renal issues  - Strict I/O's  - CTM Electrolytes    Heme:   - Hemodynamically stable  - Minimal intra-operative blood loss    Vasc:  - DVT Prophylaxis held in immediate post-operative setting    ID:  - Ancef intra-operatively  - Monitor fever curve    DISPO: SICU for q 1 hour neuro checks. 55yom; Hx: HTN recently admitted to [facility] with worsening pituitary adenoma w/ visual changes. Presents from OR for post-operative management and neurological checks. Pt is s/p a Transphenoidal resection of a pituitary macroadenoma, here in SICU for post-operative q 1 hour neuro checks.     PLAN:    Neuro: Pituitary Macreoadenoma  - S/P TSP Resection 8/5/20 POD: 0  - q 1 Hour Neuro Checks  - Lumbar Drain removed intraop given no CSF leak detected. Watched for signs of increased ICP (positional HA).   - nasal precautions: no nasal manipulation, straws, PPV. Nasal packing in place from ENT.   - postop imaging deferred by team     Respiratory: No active respiratory issues  - Saturating well with supplemental O2 from facemask     CV: No active cardiovascular issues  - HD stable, without pressor requirements  - EBL intra-operatively: 100 ml    GI:  - NPO in immediate post-operative setting  - Advance diet as recommended by ENT    :   - No active renal issues  - C/f DI given pituitary surgery. CTM UOP   - Strict I/O's  - CTM Electrolytes. q6BMP    Heme:   - Hemodynamically stable  - Minimal intra-operative blood loss    Vasc:  - DVT Prophylaxis held in immediate post-operative setting for 24 hrs per NSGY.     ID:  - Ancef intra-operatively. COntinue for 24 hour coverage   - Monitor fever curve    DISPO: SICU for q 1 hour neuro checks.

## 2020-08-05 NOTE — CONSULT NOTE ADULT - ASSESSMENT
55y male with PMhx of HTN and pituitary macroadenoma s/p TSR of pituitary macroadenoma (8/5/20).    #Pituitary Macroadenoma s/p resection (8/5/20):  - Continue to monitor urine output q1hr and [id number]  - If urine output is persistently >250cc/hour and Serum Na is rising can consider giving 1 dose of 1mcg IV DDAVP.  - Patient noted to have low cortisol levels on prior workup. Therefore would recommend checking AM cortisol and ACTH.  - Check FT4  - Patient also noted to have findings suggestive of hypogonadotropic hypogonadism (Low Testosterone levels in the setting of low FSH and LH). Can consider repeating levels outpatient.   - Will need to f/u pathology from pituitary resection.    Patient should f/u with Endocrine outpatient at the location provided below:    [facility]   [address] [place] [address] [phone]    Discussed plan with primary team    Case discussed with Attending Physician. Follow up Attending attestation for final recommendations. Please feel free to page the number below for any further questions.     Dary Gomez M.D  Endocrine Fellow  Pager #[phone]

## 2020-08-05 NOTE — BRIEF OPERATIVE NOTE - OPERATION/FINDINGS
Endoscopic endonasal transphenoidal resection of pituitary macroadenoma Endoscopic endonasal transphenoidal resection of pituitary macroadenoma; preop placement of lumbar drain, removed post operatively in OR

## 2020-08-06 LAB
ANION GAP SERPL CALC-SCNC: 17 MMO/L — HIGH (ref 7–14)
APTT BLD: 25.5 SEC — LOW (ref 27–36.3)
BUN SERPL-MCNC: 12 MG/DL — SIGNIFICANT CHANGE UP (ref 7–23)
CALCIUM SERPL-MCNC: 9.4 MG/DL — SIGNIFICANT CHANGE UP (ref 8.4–10.5)
CHLORIDE SERPL-SCNC: 95 MMOL/L — LOW (ref 98–107)
CO2 SERPL-SCNC: 24 MMOL/L — SIGNIFICANT CHANGE UP (ref 22–31)
CORTIS SERPL-MCNC: 21.6 UG/DL — HIGH (ref 2.7–18.4)
CREAT SERPL-MCNC: 0.94 MG/DL — SIGNIFICANT CHANGE UP (ref 0.5–1.3)
GLUCOSE SERPL-MCNC: 148 MG/DL — HIGH (ref 70–99)
HCT VFR BLD CALC: 41.1 % — SIGNIFICANT CHANGE UP (ref 39–50)
HCV AB S/CO SERPL IA: 1.88 S/CO — HIGH (ref 0–0.99)
HCV AB SERPL-IMP: HIGH
HGB BLD-MCNC: 13.2 G/DL — SIGNIFICANT CHANGE UP (ref 13–17)
INR BLD: 1.18 — HIGH (ref 0.88–1.16)
MAGNESIUM SERPL-MCNC: 1.8 MG/DL — SIGNIFICANT CHANGE UP (ref 1.6–2.6)
MCHC RBC-ENTMCNC: 28.4 PG — SIGNIFICANT CHANGE UP (ref 27–34)
MCHC RBC-ENTMCNC: 32.1 % — SIGNIFICANT CHANGE UP (ref 32–36)
MCV RBC AUTO: 88.4 FL — SIGNIFICANT CHANGE UP (ref 80–100)
NRBC # FLD: 0 K/UL — SIGNIFICANT CHANGE UP (ref 0–0)
PHOSPHATE SERPL-MCNC: 3.2 MG/DL — SIGNIFICANT CHANGE UP (ref 2.5–4.5)
PLATELET # BLD AUTO: 126 K/UL — LOW (ref 150–400)
PMV BLD: 11.7 FL — SIGNIFICANT CHANGE UP (ref 7–13)
POTASSIUM SERPL-MCNC: 4.2 MMOL/L — SIGNIFICANT CHANGE UP (ref 3.5–5.3)
POTASSIUM SERPL-SCNC: 4.2 MMOL/L — SIGNIFICANT CHANGE UP (ref 3.5–5.3)
PROTHROM AB SERPL-ACNC: 13.4 SEC — SIGNIFICANT CHANGE UP (ref 10.6–13.6)
RBC # BLD: 4.65 M/UL — SIGNIFICANT CHANGE UP (ref 4.2–5.8)
RBC # FLD: 12.3 % — SIGNIFICANT CHANGE UP (ref 10.3–14.5)
SODIUM SERPL-SCNC: 136 MMOL/L — SIGNIFICANT CHANGE UP (ref 135–145)
T4 FREE SERPL-MCNC: 0.82 NG/DL — LOW (ref 0.9–1.8)
WBC # BLD: 11.75 K/UL — HIGH (ref 3.8–10.5)
WBC # FLD AUTO: 11.75 K/UL — HIGH (ref 3.8–10.5)

## 2020-08-06 PROCEDURE — 99232 SBSQ HOSP IP/OBS MODERATE 35: CPT

## 2020-08-06 PROCEDURE — 99233 SBSQ HOSP IP/OBS HIGH 50: CPT | Mod: GC

## 2020-08-06 RX ORDER — MAGNESIUM SULFATE 500 MG/ML
2 VIAL (ML) INJECTION ONCE
Refills: 0 | Status: COMPLETED | OUTPATIENT
Start: 2020-08-06 | End: 2020-08-06

## 2020-08-06 RX ORDER — ACETAMINOPHEN 500 MG
1000 TABLET ORAL ONCE
Refills: 0 | Status: COMPLETED | OUTPATIENT
Start: 2020-08-06 | End: 2020-08-06

## 2020-08-06 RX ORDER — HYDROMORPHONE HYDROCHLORIDE 2 MG/ML
0.5 INJECTION INTRAMUSCULAR; INTRAVENOUS; SUBCUTANEOUS ONCE
Refills: 0 | Status: DISCONTINUED | OUTPATIENT
Start: 2020-08-06 | End: 2020-08-06

## 2020-08-06 RX ORDER — HYDROMORPHONE HYDROCHLORIDE 2 MG/ML
1 INJECTION INTRAMUSCULAR; INTRAVENOUS; SUBCUTANEOUS ONCE
Refills: 0 | Status: DISCONTINUED | OUTPATIENT
Start: 2020-08-06 | End: 2020-08-06

## 2020-08-06 RX ORDER — HEPARIN SODIUM 5000 [USP'U]/ML
5000 INJECTION INTRAVENOUS; SUBCUTANEOUS EVERY 8 HOURS
Refills: 0 | Status: DISCONTINUED | OUTPATIENT
Start: 2020-08-06 | End: 2020-08-07

## 2020-08-06 RX ADMIN — Medication 1000 MILLIGRAM(S): at 16:45

## 2020-08-06 RX ADMIN — LISINOPRIL 20 MILLIGRAM(S): 2.5 TABLET ORAL at 06:10

## 2020-08-06 RX ADMIN — Medication 1 TABLET(S): at 11:30

## 2020-08-06 RX ADMIN — Medication 50 GRAM(S): at 06:10

## 2020-08-06 RX ADMIN — Medication 400 MILLIGRAM(S): at 16:15

## 2020-08-06 RX ADMIN — HYDROMORPHONE HYDROCHLORIDE 1 MILLIGRAM(S): 2 INJECTION INTRAMUSCULAR; INTRAVENOUS; SUBCUTANEOUS at 08:30

## 2020-08-06 RX ADMIN — SENNA PLUS 2 TABLET(S): 8.6 TABLET ORAL at 21:32

## 2020-08-06 RX ADMIN — AMLODIPINE BESYLATE 10 MILLIGRAM(S): 2.5 TABLET ORAL at 06:10

## 2020-08-06 RX ADMIN — HYDROMORPHONE HYDROCHLORIDE 0.5 MILLIGRAM(S): 2 INJECTION INTRAMUSCULAR; INTRAVENOUS; SUBCUTANEOUS at 20:51

## 2020-08-06 RX ADMIN — Medication 1000 MILLIGRAM(S): at 00:00

## 2020-08-06 RX ADMIN — Medication 400 MILLIGRAM(S): at 07:03

## 2020-08-06 RX ADMIN — HYDROMORPHONE HYDROCHLORIDE 1 MILLIGRAM(S): 2 INJECTION INTRAMUSCULAR; INTRAVENOUS; SUBCUTANEOUS at 08:45

## 2020-08-06 RX ADMIN — HEPARIN SODIUM 5000 UNIT(S): 5000 INJECTION INTRAVENOUS; SUBCUTANEOUS at 21:31

## 2020-08-06 RX ADMIN — SODIUM CHLORIDE 75 MILLILITER(S): 9 INJECTION, SOLUTION INTRAVENOUS at 08:28

## 2020-08-06 RX ADMIN — Medication 1000 MILLIGRAM(S): at 07:33

## 2020-08-06 RX ADMIN — HYDROMORPHONE HYDROCHLORIDE 0.5 MILLIGRAM(S): 2 INJECTION INTRAMUSCULAR; INTRAVENOUS; SUBCUTANEOUS at 19:51

## 2020-08-06 NOTE — PROGRESS NOTE ADULT - ATTENDING COMMENTS
Agree with notes of health care providers on my service (PAs, Residents and House Staff).  The patient is in SICU with diagnosis mentioned in the note.    The SICU team has a constant risk benefit analyzes discussion with the primary team, all consultants, House Staff and PA's.  55yom; Hx: HTN recently admitted to [facility] with worsening pituitary adenoma w/ visual changes. Presents from OR for post-operative management and neurological checks. Pt is s/p a Transphenoidal resection of a pituitary macroadenoma, here in SICU for post-operative q 1 hour neuro checks.   I have personally examined the patient, reviewed data and laboratory tests/x-rays and all pertinent electronic images.    EXAM  NEUROLOGY  GCS:  15  Exam: Normal, NAD, alert, oriented x3, no focal deficits.  HEENT  Exam: Normocephalic, EOMI.  RESPIRATORY  Exam: Lungs clear to auscultation, Normal expansion/effort.  Mechanical Ventilation: RA  CARDIOVASCULAR  Exam: S1, S2.  Regular rate and rhythm.   GI/NUTRITION  Exam: Abdomen soft, Non-tender, Non-distended.  VASCULAR  Exam: Extremities warm,  The assessment and plan is specified below:  PLAN:    Neuro: Pituitary Macreoadenoma  - S/P TSP Resection 8/5/20 POD: 0  - q 1 Hour Neuro Checks, transition to q2 in am   - Lumbar Drain removed intraop given no CSF leak detected. Watched for signs of increased ICP (positional HA).   - nasal precautions: no nasal manipulation, straws, PPV. Nasal packing in place from ENT.   - postop imaging deferred by team     Respiratory: No active respiratory issues  - Saturating well on RA    CV: No active cardiovascular issues  - HD stable, without pressor requirements  - EBL intra-operatively: 100 ml  - Post-operative studies WNL  - HTN Medications (PO) restarted    GI:  - CLD, advance diet as tolerated  - IVL    :   - No active renal issues  - C/f DI given pituitary surgery. CTM UOP   - Strict I/O's  - d/c Torres  - CTM Electrolytes. q6BMP    Heme:   - Hemodynamically stable  - Minimal intra-operative blood loss    Vasc:  - DVT Prophylaxis held in immediate post-operative setting for 24 hrs per NSGY.     ID:  - Ancef intra-operatively. Continue for 24 hour coverage   - Monitor fever curve    DISPO:   SICU for neurological monitoring
55M s/p transsphenoidal resection pituitary macroadenoma. Monitor for DI, thus far not requiring DDAVP.  Cortisol level robust, not consistent with adrenal insufficiency.  Free T4 mildly low - could be secondary hypothyroidism vs. euthyroid sick syndrome.  Repeat Free T4 and TSH in AM.  Hypogonadism to recheck levels in outpatient.  Will follow. Complex patient high level decision making.    Tripp Bui MD  Division of Endocrinology  Pager: [phone]    If after 6PM or before 9AM, or on weekends/holidays, please call endocrine answering service for assistance ([phone]).  For nonurgent matters email [email or web address] for assistance.

## 2020-08-06 NOTE — PROGRESS NOTE ADULT - ASSESSMENT
55y s/p endoscopic transsphenoidal resection of pituitary lesion POD #1     PLAN:   - keep normotensive   - endo labs and f/u recs  - no steroids   - no post op imaging     Case discussed with attending neurosurgeon.

## 2020-08-06 NOTE — PROGRESS NOTE ADULT - ASSESSMENT
55y male with PMhx of HTN and pituitary macroadenoma s/p TSR of pituitary macroadenoma (8/5/20).    #Pituitary Macroadenoma s/p resection (8/5/20):  - Continue to monitor urine output q1hr and [id number]  - If urine output is persistently >250cc/hour and Serum Na is rising can consider giving 1 dose of 1mcg IV DDAVP.  - Patient noted to have low cortisol levels on prior workup. Repeat AM cortisol 21.6. Although the cortisol was collected at 3am, the levels are reassuring and make AI unlikely.   - FT4 noted to be mildly low. Recommend rechecking FT4 and TSH levels tomorrow morning.   - Patient also noted to have findings suggestive of hypogonadotropic hypogonadism (Low Testosterone levels in the setting of low FSH and LH). Can consider repeating levels outpatient.   - Will need to f/u pathology from pituitary resection.    Patient should f/u with Endocrine outpatient at the location provided below:    [facility]   [address] [place] [address] [phone]    Discussed plan with primary team    Case discussed with Attending Physician. Follow up Attending attestation for final recommendations. Please feel free to page the number below for any further questions.     Dary Gomez M.D  Endocrine Fellow  Pager #[phone]

## 2020-08-06 NOTE — PROGRESS NOTE ADULT - SUBJECTIVE AND OBJECTIVE BOX
56 yo M w/ HTN s/p transphenoidal resection of pituitary adenoma.    No acute events overnight. Pt is breathing comfortably on room air. Pain well controlled on current regimen.  Sodium levels stable.    Vital Signs Last 24 Hrs  T(C): 36.7 (06 Aug 2020 08:00), Max: 36.7 (06 Aug 2020 04:00)  T(F): 98 (06 Aug 2020 08:00), Max: 98 (06 Aug 2020 04:00)  HR: 71 (06 Aug 2020 09:00) (70 - 92)  BP: 135/69 (06 Aug 2020 09:00) (135/69 - 183/93)  BP(mean): 83 (06 Aug 2020 09:00) (83 - 117)  RR: 14 (06 Aug 2020 09:00) (14 - 18)  SpO2: 92% (06 Aug 2020 09:00) (92% - 99%)    PHYSICAL EXAM:    CONSTITUTIONAL: Well nourished, well developed, NON-DYSMORPHIC, and in no acute distress.    HEAD: normocephalic, atraumatic.  EARS: The right/left pinna was normal. The right/left external auditory canal was normal and the right/left TM was intact and well aerated. Incision is c/d/i.  NOSE: Normal external nose. Anterior nasal cavity patent with no obstruction. Inferior turbinates normally sized.  ORAL CAVITY/OROPHARYNX: normal mucosa. No erythema, lesions or bleeding.  NECK: No cervical lymphadenopathy  RESPIRATORY: Respirations unlabored, no increased work of breathing with use of accessory muscles and retractions. No stridor.  CARDIAC: Warm extremities, no cyanosis.                           13.2   11.75 )-----------( 126      ( 06 Aug 2020 03:30 )             41.1   08-06    136  |  95<L>  |  12  ----------------------------<  148<H>  4.2   |  24  |  0.94    Ca    9.4      06 Aug 2020 03:30  Phos  3.2     08-06  Mg     1.8     08-06    \

## 2020-08-06 NOTE — PROGRESS NOTE ADULT - ASSESSMENT
54 yo M w/ HTN s/p transphenoidal resection of pituitary adenoma. Recovering adequately per ENT.    - C/w neurosurg/SICU/endo care (DI watch/neuro checks)

## 2020-08-06 NOTE — PROGRESS NOTE ADULT - SUBJECTIVE AND OBJECTIVE BOX
POST ANESTHESIA EVALUATION    55y Male POSTOP DAY 1 S/P     MENTAL STATUS: Patient participation [ x ] Awake     [  ] Arousable     [  ] Sedated    AIRWAY PATENCY: [ x ] Satisfactory  [  ] Other:     Vital Signs Last 24 Hrs  T(C): 36.7 (06 Aug 2020 08:00), Max: 36.7 (06 Aug 2020 04:00)  T(F): 98 (06 Aug 2020 08:00), Max: 98 (06 Aug 2020 04:00)  HR: 63 (06 Aug 2020 11:00) (63 - 92)  BP: 123/69 (06 Aug 2020 11:00) (119/63 - 183/93)  BP(mean): 82 (06 Aug 2020 11:00) (76 - 117)  RR: 11 (06 Aug 2020 11:00) (11 - 18)  SpO2: 96% (06 Aug 2020 11:00) (92% - 99%)  I&O's Summary    05 Aug 2020 07:01  -  06 Aug 2020 07:00  --------------------------------------------------------  IN: 2129 mL / OUT: 3160 mL / NET: -1031 mL    06 Aug 2020 07:01  -  06 Aug 2020 11:18  --------------------------------------------------------  IN: 700 mL / OUT: 190 mL / NET: 510 mL          NAUSEA/ VOMITTING:  [  ] NONE  [ x ] CONTROLLED [  ] OTHER     PAIN: [  x] CONTROLLED WITH CURRENT REGIMEN  [  ] OTHER    [ x ] NO APPARENT ANESTHESIA COMPLICATIONS      Comments:

## 2020-08-06 NOTE — PROGRESS NOTE ADULT - ASSESSMENT
55yom; Hx: HTN recently admitted to [facility] with worsening pituitary adenoma w/ visual changes. Presents from OR for post-operative management and neurological checks. Pt is s/p a Transphenoidal resection of a pituitary macroadenoma, here in SICU for post-operative q 1 hour neuro checks.     PLAN:    Neuro: Pituitary Macreoadenoma  - S/P TSP Resection 8/5/20 POD: 0  - q 1 Hour Neuro Checks  - Lumbar Drain removed intraop given no CSF leak detected. Watched for signs of increased ICP (positional HA).   - nasal precautions: no nasal manipulation, straws, PPV. Nasal packing in place from ENT.   - postop imaging deferred by team     Respiratory: No active respiratory issues  - Saturating well on RA    CV: No active cardiovascular issues  - HD stable, without pressor requirements  - EBL intra-operatively: 100 ml  - Post-operative studies WNL  - HTN Medications (PO) restarted    GI:  - NPO, except medications  - Advance diet as recommended by ENT    :   - No active renal issues  - C/f DI given pituitary surgery. CTM UOP   - Strict I/O's  - CTM Electrolytes. q6BMP    Heme:   - Hemodynamically stable  - Minimal intra-operative blood loss    Vasc:  - DVT Prophylaxis held in immediate post-operative setting for 24 hrs per NSGY.     ID:  - Ancef intra-operatively. Continue for 24 hour coverage   - Monitor fever curve    DISPO: SICU for q 1 hour neuro checks. 55yom; Hx: HTN recently admitted to [facility] with worsening pituitary adenoma w/ visual changes. Presents from OR for post-operative management and neurological checks. Pt is s/p a Transphenoidal resection of a pituitary macroadenoma, here in SICU for post-operative q 1 hour neuro checks.     PLAN:    Neuro: Pituitary Macreoadenoma  - S/P TSP Resection 8/5/20 POD: 0  - q 1 Hour Neuro Checks, transition to q2 in am   - Lumbar Drain removed intraop given no CSF leak detected. Watched for signs of increased ICP (positional HA).   - nasal precautions: no nasal manipulation, straws, PPV. Nasal packing in place from ENT.   - postop imaging deferred by team     Respiratory: No active respiratory issues  - Saturating well on RA    CV: No active cardiovascular issues  - HD stable, without pressor requirements  - EBL intra-operatively: 100 ml  - Post-operative studies WNL  - HTN Medications (PO) restarted    GI:  - CLD, advance diet as tolerated  - IVL    :   - No active renal issues  - C/f DI given pituitary surgery. CTM UOP   - Strict I/O's  - d/c Torres  - CTM Electrolytes. q6BMP    Heme:   - Hemodynamically stable  - Minimal intra-operative blood loss    Vasc:  - DVT Prophylaxis held in immediate post-operative setting for 24 hrs per NSGY.     ID:  - Ancef intra-operatively. Continue for 24 hour coverage   - Monitor fever curve    DISPO:   SICU for neurological monitoring

## 2020-08-06 NOTE — PROGRESS NOTE ADULT - SUBJECTIVE AND OBJECTIVE BOX
Endocrine F/u Note:    Chief Complaint:  55y male with PMhx of HTN and pituitary macroadenoma s/p TSR of pituitary macroadenoma (8/5/20).    Interval History:  Patient seen at bedside, doing well.   Denies polyuria and polydipsia. Tolerating PO.  Urine output <250cc/hr  Na 136    ROS:   Denies fevers, chills  Denies n/v/d, abdominal Pain  Denies CP, SOB    Prior Pituitary Labs (7/2020)  TSH 2.02  T4 4.41  Prolactin 36.5  LH 3.7  FSH 3.5  Testosterone (04/2020) 203  ACTH 29.7  Cortisol 4.1  IGF-1 98    MEDICATIONS  (STANDING):  amLODIPine   Tablet 10 milliGRAM(s) Oral daily  chlorhexidine 4% Liquid 1 Application(s) Topical <User Schedule>  heparin   Injectable 5000 Unit(s) SubCutaneous every 8 hours  lisinopril 20 milliGRAM(s) Oral daily  multivitamin 1 Tablet(s) Oral daily  senna 2 Tablet(s) Oral at bedtime    MEDICATIONS  (PRN):  ondansetron Injectable 4 milliGRAM(s) IV Push every 6 hours PRN Nausea    PHYSICAL EXAM:  Vital Signs Last 24 Hrs  T(C): 36.6 (06 Aug 2020 16:00), Max: 36.7 (06 Aug 2020 04:00)  T(F): 97.8 (06 Aug 2020 16:00), Max: 98 (06 Aug 2020 04:00)  HR: 86 (06 Aug 2020 17:00) (63 - 86)  BP: 138/83 (06 Aug 2020 17:00) (119/63 - 183/93)  BP(mean): 94 (06 Aug 2020 17:00) (76 - 117)  RR: 16 (06 Aug 2020 17:00) (11 - 18)  SpO2: 95% (06 Aug 2020 17:00) (92% - 96%)  GENERAL: NAD  EYES: No proptosis, anicteric  RESPIRATORY: Clear to auscultation bilaterally in anterior lung fields; No rales, rhonchi, wheezing  CARDIOVASCULAR: Regular rate and rhythm; No murmurs appreciated; no peripheral edema  GI: Soft, nontender, non distended, normal bowel sounds  MUSCULOSKELETAL: Moving all extremities   PSYCH: Answering all questions appropriately     08-06    136  |  95<L>  |  12  ----------------------------<  148<H>  4.2   |  24  |  0.94    Ca    9.4      06 Aug 2020 03:30  Phos  3.2     08-06  Mg     1.8     08-06

## 2020-08-06 NOTE — PROGRESS NOTE ADULT - SUBJECTIVE AND OBJECTIVE BOX
NEUROSURGERY NOTE  THAIS IVY   08-06-20 @ 02:44    PAST 24HR EVENTS: no issues overnight, patient SBP slightly elevated. s/p endoscopic transsphenoidal resection of pituitary lesion POD #1     PHYSICAL EXAM:  Vital Signs Last 24 Hrs  T(C): 36.3 (06 Aug 2020 00:00), Max: 37.2 (05 Aug 2020 06:41)  T(F): 97.4 (06 Aug 2020 00:00), Max: 98.9 (05 Aug 2020 06:41)  HR: 73 (06 Aug 2020 02:00) (58 - 92)  BP: 156/86 (06 Aug 2020 02:00) (141/84 - 183/93)  BP(mean): 101 (06 Aug 2020 02:00) (93 - 117)  RR: 15 (06 Aug 2020 02:00) (14 - 17)  SpO2: 94% (06 Aug 2020 02:00) (92% - 100%)    AAOX3  MAEx4 with good strength   PERRL, EOMI  Incision C/D/I  no leaking from nose     MEDS:   amLODIPine   Tablet 10 milliGRAM(s) Oral daily  chlorhexidine 4% Liquid 1 Application(s) Topical <User Schedule>  lactated ringers. 1000 milliLiter(s) IV Continuous <Continuous>  lisinopril 20 milliGRAM(s) Oral daily  multivitamin 1 Tablet(s) Oral daily  ondansetron Injectable 4 milliGRAM(s) IV Push every 6 hours PRN  senna 2 Tablet(s) Oral at bedtime      LABS:                        13.8   8.73  )-----------( 162      ( 05 Aug 2020 14:20 )             43.4     08-05    136  |  97<L>  |  13  ----------------------------<  171<H>  5.3   |  21<L>  |  1.08    Ca    8.6      05 Aug 2020 21:40          RADIOLOGY:

## 2020-08-06 NOTE — PROGRESS NOTE ADULT - SUBJECTIVE AND OBJECTIVE BOX
SICU Daily Progress Note  =====================================================  Interval/Overnight Events:    - No acute overnight events  - Pt hypertensive, initiated home anti-hypertensive medications    POD 1          	SICU Day 1    HPI:      55yom; Hx: HTN recently admitted to [facility] with worsening pituitary adenoma w/ visual changes. Presents from OR for post-operative management and neurological checks. Pt is s/p a Transphenoidal resection of a pituitary macroadenoma on 8/5 joint case w/ ENT for approach. Lumbar drain was placed prophylactically during surgery for concern of CSF leak, but no leak was encountered so drain was removed intraop. EBL 200cc, UOP 100cc, and 1200 crystalloid given during case. SICU is being q1 hour neurochecks following surgery. No imaging requested by team postop.       PMH:     - HTN    Allergies: ciprofloxacin (Nausea; Diarrhea)  losartan (Other)  Milk (Unknown)      MEDICATIONS:   --------------------------------------------------------------------------------------  Neurologic Medications  ondansetron Injectable 4 milliGRAM(s) IV Push every 6 hours PRN Nausea    Respiratory Medications    Cardiovascular Medications  amLODIPine   Tablet 10 milliGRAM(s) Oral daily  lisinopril 20 milliGRAM(s) Oral daily    Gastrointestinal Medications  lactated ringers. 1000 milliLiter(s) IV Continuous <Continuous>  multivitamin 1 Tablet(s) Oral daily  senna 2 Tablet(s) Oral at bedtime    Genitourinary Medications    Hematologic/Oncologic Medications    Antimicrobial/Immunologic Medications    Endocrine/Metabolic Medications    Topical/Other Medications  chlorhexidine 4% Liquid 1 Application(s) Topical <User Schedule>    --------------------------------------------------------------------------------------    VITAL SIGNS, INS/OUTS (last 24 hours):  --------------------------------------------------------------------------------------  ICU Vital Signs Last 24 Hrs  T(C): 36.3 (05 Aug 2020 20:00), Max: 37.2 (05 Aug 2020 06:41)  T(F): 97.4 (05 Aug 2020 20:00), Max: 98.9 (05 Aug 2020 06:41)  HR: 77 (05 Aug 2020 23:00) (58 - 92)  BP: 175/90 (05 Aug 2020 23:00) (141/84 - 183/93)  BP(mean): 110 (05 Aug 2020 23:00) (93 - 117)  ABP: --  ABP(mean): --  RR: 16 (05 Aug 2020 23:00) (14 - 17)  SpO2: 95% (05 Aug 2020 23:00) (92% - 100%)    --------------------------------------------------------------------------------------    EXAM  NEUROLOGY     	GCS:  15  Exam: Normal, NAD, alert, oriented x3, no focal deficits.    HEENT  Exam: Normocephalic, EOMI.    RESPIRATORY  Exam: Lungs clear to auscultation, Normal expansion/effort.  Mechanical Ventilation: RA    CARDIOVASCULAR  Exam: S1, S2.  Regular rate and rhythm.     GI/NUTRITION  Exam: Abdomen soft, Non-tender, Non-distended.  Wound:  ***  Current Diet:  NPO***    VASCULAR  Exam: Extremities warm, pink, well-perfused.    MUSCULOSKELETAL  Exam: All extremities moving spontaneously without limitations.       METABOLIC/FLUIDS/ELECTROLYTES  lactated ringers. 1000 milliLiter(s) IV Continuous <Continuous>  multivitamin 1 Tablet(s) Oral daily      HEMATOLOGIC  [x] VTE Prophylaxis: HELD POST-OPERATIVELY 24 HOURS  Transfusions:	[] PRBC	[] Platelets		[] FFP	[] Cryoprecipitate    INFECTIOUS DISEASE  Antimicrobials/Immunologic Medications:      Tubes/Lines/Drains  ***  [x] Peripheral IV  [] Central Venous Line     	[] R	[] L	[] IJ	[] Fem	[] SC	Date Placed:   [] Arterial Line		[] R	[] L	[] Fem	[] Rad	[] Ax	Date Placed:   [] PICC		[] Midline		[] Mediport  [] Urinary Catheter		Date Placed:   [x] Necessity of urinary, arterial, and venous catheters discussed    LABS  --------------------------------------------------------------------------------------  ((Insert SICU Labs here))***  --------------------------------------------------------------------------------------    OTHER LABORATORY:     IMAGING STUDIES:   CXR:       --------------------------------------------------------------------------------------    Critical Care Diagnoses: SICU Daily Progress Note  =====================================================  Interval/Overnight Events:    - No acute overnight events  - Pt hypertensive, initiated home anti-hypertensive medications  - changed from q1 to q2 neuro checks    POD 1   SICU Day 1    HPI:      55yom; Hx: HTN recently admitted to [facility] with worsening pituitary adenoma w/ visual changes. Presents from OR for post-operative management and neurological checks. Pt is s/p a Transphenoidal resection of a pituitary macroadenoma on 8/5 joint case w/ ENT for approach. Lumbar drain was placed prophylactically during surgery for concern of CSF leak, but no leak was encountered so drain was removed intraop. EBL 200cc, UOP 100cc, and 1200 crystalloid given during case. SICU is being q1 hour neurochecks following surgery. No imaging requested by team postop.       PMH:     - HTN    Allergies: ciprofloxacin (Nausea; Diarrhea)  losartan (Other)  Milk (Unknown)      MEDICATIONS:   --------------------------------------------------------------------------------------  Neurologic Medications  ondansetron Injectable 4 milliGRAM(s) IV Push every 6 hours PRN Nausea    Respiratory Medications    Cardiovascular Medications  amLODIPine   Tablet 10 milliGRAM(s) Oral daily  lisinopril 20 milliGRAM(s) Oral daily    Gastrointestinal Medications  lactated ringers. 1000 milliLiter(s) IV Continuous <Continuous>  multivitamin 1 Tablet(s) Oral daily  senna 2 Tablet(s) Oral at bedtime    Genitourinary Medications    Hematologic/Oncologic Medications    Antimicrobial/Immunologic Medications    Endocrine/Metabolic Medications    Topical/Other Medications  chlorhexidine 4% Liquid 1 Application(s) Topical <User Schedule>    --------------------------------------------------------------------------------------    VITAL SIGNS, INS/OUTS (last 24 hours):  --------------------------------------------------------------------------------------  ICU Vital Signs Last 24 Hrs  T(C): 36.3 (05 Aug 2020 20:00), Max: 37.2 (05 Aug 2020 06:41)  T(F): 97.4 (05 Aug 2020 20:00), Max: 98.9 (05 Aug 2020 06:41)  HR: 77 (05 Aug 2020 23:00) (58 - 92)  BP: 175/90 (05 Aug 2020 23:00) (141/84 - 183/93)  BP(mean): 110 (05 Aug 2020 23:00) (93 - 117)  ABP: --  ABP(mean): --  RR: 16 (05 Aug 2020 23:00) (14 - 17)  SpO2: 95% (05 Aug 2020 23:00) (92% - 100%)    --------------------------------------------------------------------------------------    EXAM  NEUROLOGY     	GCS:  15  Exam: Normal, NAD, alert, oriented x3, no focal deficits.    HEENT  Exam: Normocephalic, EOMI.    RESPIRATORY  Exam: Lungs clear to auscultation, Normal expansion/effort.  Mechanical Ventilation: RA    CARDIOVASCULAR  Exam: S1, S2.  Regular rate and rhythm.     GI/NUTRITION  Exam: Abdomen soft, Non-tender, Non-distended.  Wound:  ***  Current Diet:  NPO***    VASCULAR  Exam: Extremities warm, pink, well-perfused.    MUSCULOSKELETAL  Exam: All extremities moving spontaneously without limitations.       METABOLIC/FLUIDS/ELECTROLYTES  lactated ringers. 1000 milliLiter(s) IV Continuous <Continuous>  multivitamin 1 Tablet(s) Oral daily      HEMATOLOGIC  [x] VTE Prophylaxis: HELD POST-OPERATIVELY 24 HOURS  Transfusions:	[] PRBC	[] Platelets		[] FFP	[] Cryoprecipitate    INFECTIOUS DISEASE  Antimicrobials/Immunologic Medications:      Tubes/Lines/Drains  ***  [x] Peripheral IV  [] Central Venous Line     	[] R	[] L	[] IJ	[] Fem	[] SC	Date Placed:   [] Arterial Line		[] R	[] L	[] Fem	[] Rad	[] Ax	Date Placed:   [] PICC		[] Midline		[] Mediport  [] Urinary Catheter		Date Placed:   [x] Necessity of urinary, arterial, and venous catheters discussed    LABS  --------------------------------------------------------------------------------------  ((Insert SICU Labs here))***  --------------------------------------------------------------------------------------    OTHER LABORATORY:     IMAGING STUDIES:   CXR:       --------------------------------------------------------------------------------------    Critical Care Diagnoses:

## 2020-08-07 ENCOUNTER — TRANSCRIPTION ENCOUNTER (OUTPATIENT)
Age: 55
End: 2020-08-07

## 2020-08-07 VITALS
TEMPERATURE: 98 F | DIASTOLIC BLOOD PRESSURE: 88 MMHG | OXYGEN SATURATION: 96 % | RESPIRATION RATE: 14 BRPM | SYSTOLIC BLOOD PRESSURE: 144 MMHG | HEART RATE: 75 BPM

## 2020-08-07 LAB
ANION GAP SERPL CALC-SCNC: 16 MMO/L — HIGH (ref 7–14)
APTT BLD: 27 SEC — SIGNIFICANT CHANGE UP (ref 27–36.3)
BUN SERPL-MCNC: 17 MG/DL — SIGNIFICANT CHANGE UP (ref 7–23)
CALCIUM SERPL-MCNC: 9.6 MG/DL — SIGNIFICANT CHANGE UP (ref 8.4–10.5)
CHLORIDE SERPL-SCNC: 101 MMOL/L — SIGNIFICANT CHANGE UP (ref 98–107)
CO2 SERPL-SCNC: 24 MMOL/L — SIGNIFICANT CHANGE UP (ref 22–31)
CORTIS SERPL-MCNC: 9.9 UG/DL — SIGNIFICANT CHANGE UP (ref 2.7–18.4)
CREAT SERPL-MCNC: 1.07 MG/DL — SIGNIFICANT CHANGE UP (ref 0.5–1.3)
GLUCOSE SERPL-MCNC: 137 MG/DL — HIGH (ref 70–99)
HCT VFR BLD CALC: 38.2 % — LOW (ref 39–50)
HGB BLD-MCNC: 12.6 G/DL — LOW (ref 13–17)
INR BLD: 1.02 — SIGNIFICANT CHANGE UP (ref 0.88–1.16)
MAGNESIUM SERPL-MCNC: 2.4 MG/DL — SIGNIFICANT CHANGE UP (ref 1.6–2.6)
MCHC RBC-ENTMCNC: 29.3 PG — SIGNIFICANT CHANGE UP (ref 27–34)
MCHC RBC-ENTMCNC: 33 % — SIGNIFICANT CHANGE UP (ref 32–36)
MCV RBC AUTO: 88.8 FL — SIGNIFICANT CHANGE UP (ref 80–100)
NRBC # FLD: 0 K/UL — SIGNIFICANT CHANGE UP (ref 0–0)
PHOSPHATE SERPL-MCNC: 2.2 MG/DL — LOW (ref 2.5–4.5)
PLATELET # BLD AUTO: 141 K/UL — LOW (ref 150–400)
PMV BLD: 12.1 FL — SIGNIFICANT CHANGE UP (ref 7–13)
POTASSIUM SERPL-MCNC: 4.7 MMOL/L — SIGNIFICANT CHANGE UP (ref 3.5–5.3)
POTASSIUM SERPL-SCNC: 4.7 MMOL/L — SIGNIFICANT CHANGE UP (ref 3.5–5.3)
PROTHROM AB SERPL-ACNC: 11.6 SEC — SIGNIFICANT CHANGE UP (ref 10.6–13.6)
RBC # BLD: 4.3 M/UL — SIGNIFICANT CHANGE UP (ref 4.2–5.8)
RBC # FLD: 12.6 % — SIGNIFICANT CHANGE UP (ref 10.3–14.5)
SODIUM SERPL-SCNC: 141 MMOL/L — SIGNIFICANT CHANGE UP (ref 135–145)
T4 FREE SERPL-MCNC: 0.99 NG/DL — SIGNIFICANT CHANGE UP (ref 0.9–1.8)
TSH SERPL-MCNC: 0.47 UIU/ML — SIGNIFICANT CHANGE UP (ref 0.27–4.2)
WBC # BLD: 9.55 K/UL — SIGNIFICANT CHANGE UP (ref 3.8–10.5)
WBC # FLD AUTO: 9.55 K/UL — SIGNIFICANT CHANGE UP (ref 3.8–10.5)

## 2020-08-07 RX ORDER — AMLODIPINE BESYLATE 2.5 MG/1
1 TABLET ORAL
Qty: 0 | Refills: 0 | DISCHARGE
Start: 2020-08-07

## 2020-08-07 RX ORDER — SODIUM CHLORIDE 0.65 %
1 AEROSOL, SPRAY (ML) NASAL THREE TIMES A DAY
Refills: 0 | Status: DISCONTINUED | OUTPATIENT
Start: 2020-08-07 | End: 2020-08-07

## 2020-08-07 RX ORDER — HYDROMORPHONE HYDROCHLORIDE 2 MG/ML
0.5 INJECTION INTRAMUSCULAR; INTRAVENOUS; SUBCUTANEOUS ONCE
Refills: 0 | Status: DISCONTINUED | OUTPATIENT
Start: 2020-08-07 | End: 2020-08-07

## 2020-08-07 RX ORDER — ACETAMINOPHEN 500 MG
1000 TABLET ORAL ONCE
Refills: 0 | Status: COMPLETED | OUTPATIENT
Start: 2020-08-07 | End: 2020-08-07

## 2020-08-07 RX ORDER — SODIUM CHLORIDE 0.65 %
1 AEROSOL, SPRAY (ML) NASAL
Qty: 0 | Refills: 0 | DISCHARGE
Start: 2020-08-07

## 2020-08-07 RX ORDER — ACETAMINOPHEN 500 MG
2 TABLET ORAL
Qty: 0 | Refills: 0 | DISCHARGE
Start: 2020-08-07

## 2020-08-07 RX ORDER — POTASSIUM PHOSPHATE, MONOBASIC POTASSIUM PHOSPHATE, DIBASIC 236; 224 MG/ML; MG/ML
15 INJECTION, SOLUTION INTRAVENOUS ONCE
Refills: 0 | Status: COMPLETED | OUTPATIENT
Start: 2020-08-07 | End: 2020-08-07

## 2020-08-07 RX ORDER — SODIUM CHLORIDE 0.65 %
0 AEROSOL, SPRAY (ML) NASAL
Qty: 0 | Refills: 0 | DISCHARGE
Start: 2020-08-07

## 2020-08-07 RX ORDER — ACETAMINOPHEN 500 MG
2 TABLET ORAL
Qty: 0 | Refills: 0 | DISCHARGE

## 2020-08-07 RX ORDER — SODIUM CHLORIDE 0.65 %
2 AEROSOL, SPRAY (ML) NASAL
Refills: 0 | Status: DISCONTINUED | OUTPATIENT
Start: 2020-08-07 | End: 2020-08-07

## 2020-08-07 RX ORDER — ACETAMINOPHEN 500 MG
650 TABLET ORAL ONCE
Refills: 0 | Status: COMPLETED | OUTPATIENT
Start: 2020-08-07 | End: 2020-08-07

## 2020-08-07 RX ORDER — LISINOPRIL 2.5 MG/1
1 TABLET ORAL
Qty: 0 | Refills: 0 | DISCHARGE
Start: 2020-08-07

## 2020-08-07 RX ORDER — SENNA PLUS 8.6 MG/1
2 TABLET ORAL
Qty: 0 | Refills: 0 | DISCHARGE
Start: 2020-08-07

## 2020-08-07 RX ADMIN — Medication 650 MILLIGRAM(S): at 13:59

## 2020-08-07 RX ADMIN — Medication 1000 MILLIGRAM(S): at 02:15

## 2020-08-07 RX ADMIN — LISINOPRIL 20 MILLIGRAM(S): 2.5 TABLET ORAL at 05:52

## 2020-08-07 RX ADMIN — Medication 63.75 MILLIMOLE(S): at 06:01

## 2020-08-07 RX ADMIN — Medication 400 MILLIGRAM(S): at 01:15

## 2020-08-07 RX ADMIN — HYDROMORPHONE HYDROCHLORIDE 0.5 MILLIGRAM(S): 2 INJECTION INTRAMUSCULAR; INTRAVENOUS; SUBCUTANEOUS at 06:12

## 2020-08-07 RX ADMIN — AMLODIPINE BESYLATE 10 MILLIGRAM(S): 2.5 TABLET ORAL at 05:52

## 2020-08-07 RX ADMIN — Medication 1 TABLET(S): at 12:19

## 2020-08-07 RX ADMIN — HEPARIN SODIUM 5000 UNIT(S): 5000 INJECTION INTRAVENOUS; SUBCUTANEOUS at 05:52

## 2020-08-07 RX ADMIN — POTASSIUM PHOSPHATE, MONOBASIC POTASSIUM PHOSPHATE, DIBASIC 62.5 MILLIMOLE(S): 236; 224 INJECTION, SOLUTION INTRAVENOUS at 04:26

## 2020-08-07 RX ADMIN — CHLORHEXIDINE GLUCONATE 1 APPLICATION(S): 213 SOLUTION TOPICAL at 05:54

## 2020-08-07 RX ADMIN — HYDROMORPHONE HYDROCHLORIDE 0.5 MILLIGRAM(S): 2 INJECTION INTRAMUSCULAR; INTRAVENOUS; SUBCUTANEOUS at 07:12

## 2020-08-07 NOTE — DISCHARGE NOTE PROVIDER - CARE PROVIDER_API CALL
Trevor Briscoe  NEUROSURGERY - GENERAL  [address] [place] [id number]  Phone: [phone]  Fax: [phone]  Follow Up Time: 1 week    Fede Ontiveros)  Otolaryngology  [address] [place] [address], [address] [place] [address]  Phone: [phone]  Follow Up Time: 2 weeks

## 2020-08-07 NOTE — DISCHARGE NOTE PROVIDER - CARE PROVIDERS DIRECT ADDRESSES
[email or web address]

## 2020-08-07 NOTE — DISCHARGE NOTE PROVIDER - NSDCCPTREATMENT_GEN_ALL_CORE_FT
PRINCIPAL PROCEDURE  Procedure: Endoscopic transphenoidal or transnasal resection of pituitary tumor  Findings and Treatment:       SECONDARY PROCEDURE  Procedure: Endoscopic transphenoidal or transnasal resection of pituitary tumor  Findings and Treatment:

## 2020-08-07 NOTE — DISCHARGE NOTE PROVIDER - NSDCFUSCHEDAPPT_GEN_ALL_CORE_FT
THAIS IVY ; 08/19/2020 ; [facility] NeuroSurg [facility] THAIS IVY ; 08/19/2020 ; NPP NeuroSurg [address] [facility] THAIS IVY ; 08/19/2020 ; [facility] [place]

## 2020-08-07 NOTE — DISCHARGE NOTE PROVIDER - NSDCCPCAREPLAN_GEN_ALL_CORE_FT
PRINCIPAL DISCHARGE DIAGNOSIS  Diagnosis: Pituitary macroadenoma  Assessment and Plan of Treatment: Pituitary macroadenoma      SECONDARY DISCHARGE DIAGNOSES  Diagnosis: Hypogonadotropic hypogonadism  Assessment and Plan of Treatment: Hypogonadotropic hypogonadism

## 2020-08-07 NOTE — PROGRESS NOTE ADULT - ASSESSMENT
54 yo M w/ HTN s/p transphenoidal resection of pituitary adenoma. Patient without evidence of CSF leak at this time. Patient has remained normonatremic at this time, consistent with adequate recovery.     -C/w Na Checks q6hr  -F/u Neurosurgery Recommendations  -F/u Endo Recommendations  -Initiate Nasal Spray

## 2020-08-07 NOTE — DISCHARGE NOTE PROVIDER - NSDCCONDITION_GEN_ALL_CORE
Problem: Goal Outcome Summary  Goal: Goal Outcome Summary  Outcome: No Change  Pt is A & O. VSS on RA. Afebrile. Pt is ad lip in the room. Voids adequately in the BR. Pt reported sweating at night. PRN Tylenol administered for generalized pain. Tele - NSR. Will continue to monitor.        Stable

## 2020-08-07 NOTE — DISCHARGE NOTE NURSING/CASE MANAGEMENT/SOCIAL WORK - PATIENT PORTAL LINK FT
You can access the FollowMyHealth Patient Portal offered by [facility] by registering at the following website: [email or web address]. By joining [employer]’s FollowMyHealth portal, you will also be able to view your health information using other applications (apps) compatible with our system.

## 2020-08-07 NOTE — DISCHARGE NOTE PROVIDER - NSFOLLOWUPCLINICS_GEN_ALL_ED_FT
[facility] Endocrinology  Endocrinology  [address] [place] [address]  Phone: [phone]  Fax:   Follow Up Time: 2 weeks [facility] Endocrinology  Endocrinology  [address] [place] [address]  Phone: [phone]  Fax:   Follow Up Time: 2 weeks

## 2020-08-07 NOTE — PROGRESS NOTE ADULT - SUBJECTIVE AND OBJECTIVE BOX
SUBJECTIVE EVENTS: no issues o/n.     Vital Signs Last 24 Hrs  T(C): 36.7 (07 Aug 2020 04:00), Max: 36.9 (06 Aug 2020 20:00)  T(F): 98 (07 Aug 2020 04:00), Max: 98.4 (06 Aug 2020 20:00)  HR: 67 (07 Aug 2020 04:00) (63 - 86)  BP: 163/87 (07 Aug 2020 00:00) (119/63 - 171/91)  BP(mean): 107 (07 Aug 2020 00:00) (76 - 110)  RR: 12 (07 Aug 2020 04:00) (11 - 18)  SpO2: 97% (07 Aug 2020 04:00) (92% - 97%)      PHYSICAL EXAM:  Awake A&Ox3, fc  PERRL, EOMI  Normal Tone 5/5 strength equally      INCISION: clean, dry, no dc noted      DIET: regular      MEDICATIONS  (STANDING):  amLODIPine   Tablet 10 milliGRAM(s) Oral daily  chlorhexidine 4% Liquid 1 Application(s) Topical <User Schedule>  heparin   Injectable 5000 Unit(s) SubCutaneous every 8 hours  lisinopril 20 milliGRAM(s) Oral daily  multivitamin 1 Tablet(s) Oral daily  senna 2 Tablet(s) Oral at bedtime  sodium phosphate IVPB 15 milliMole(s) IV Intermittent once    MEDICATIONS  (PRN):  ondansetron Injectable 4 milliGRAM(s) IV Push every 6 hours PRN Nausea                            12.6   9.55  )-----------( 141      ( 07 Aug 2020 03:55 )             38.2   08-07    141  |  101  |  17  ----------------------------<  137<H>  4.7   |  24  |  1.07    Ca    9.6      07 Aug 2020 01:00  Phos  2.2     08-07  Mg     2.4     08-07    PT/INR - ( 07 Aug 2020 01:00 )   PT: 11.6 SEC;   INR: 1.02          PTT - ( 07 Aug 2020 01:00 )  PTT:27.0 SEC

## 2020-08-07 NOTE — PROGRESS NOTE ADULT - ASSESSMENT
55yom; Hx: HTN recently admitted to [facility] with worsening pituitary adenoma w/ visual changes. Presents from OR for post-operative management and neurological checks. Pt is s/p a Transphenoidal resection of a pituitary macroadenoma, here in SICU for post-operative q 1 hour neuro checks.       PLAN:    Neuro: Pituitary Macreoadenoma  - S/P TSP Resection 8/5  - q 4h neurochecks  - Lumbar Drain removed intraop given no CSF leak detected. Watched for signs of increased ICP (positional HA).   - nasal precautions: no nasal manipulation, straws, PPV. Nasal packing in place from ENT.   - postop imaging deferred by team     Respiratory: No active respiratory issues  - Saturating well on RA    CV: No active cardiovascular issues  - HD stable, without pressor requirements  - EBL intra-operatively: 100 ml  - Post-operative studies WNL  - HTN Medications (PO) restarted    GI:  - regular diet    :   - No active renal issues  - C/f DI given pituitary surgery. CTM UOP   - Strict I/O's  - CTM Electrolytes. q6BMP  - calhoun removed 8/6; passed TOV    Heme:   - Hemodynamically stable  - Minimal intra-operative blood loss  - restarted DVT ppx SQH 24hr postop 8/6    ID:  - Ancef intra-op  - Monitor fever curve    DISPO: listed for floor

## 2020-08-07 NOTE — PROGRESS NOTE ADULT - SUBJECTIVE AND OBJECTIVE BOX
54 yo M w/ HTN s/p transphenoidal resection of pituitary adenoma.    OVERNIGHT: No acute events overnight.     INTERVAL HPI: Pt is breathing comfortably on room air. Pain well controlled on current regimen.  Sodium levels stable. Patient denies clear discharge from nose/metallic taste in mouth.     ICU Vital Signs Last 24 Hrs  T(C): 36.7 (07 Aug 2020 08:00), Max: 36.9 (06 Aug 2020 20:00)  T(F): 98 (07 Aug 2020 08:00), Max: 98.4 (06 Aug 2020 20:00)  HR: 71 (07 Aug 2020 08:00) (63 - 86)  BP: 146/87 (07 Aug 2020 08:00) (123/69 - 171/91)  BP(mean): 101 (07 Aug 2020 08:00) (82 - 110)  ABP: --  ABP(mean): --  RR: 14 (07 Aug 2020 08:00) (11 - 16)  SpO2: 95% (07 Aug 2020 08:00) (93% - 97%)    PHYSICAL EXAM:  CONSTITUTIONAL: Well nourished, well developed, NON-DYSMORPHIC, and in no acute distress.    HEAD: normocephalic, atraumatic.  EARS: The right/left pinna was normal. The right/left external auditory canal was normal and the right/left TM was intact and well aerated. Incision is c/d/i.  NOSE: Normal external nose. Anterior nasal cavity patent with no obstruction. Inferior turbinates normally sized.  ORAL CAVITY/OROPHARYNX: normal mucosa. No erythema, lesions or bleeding.  NECK: No cervical lymphadenopathy  RESPIRATORY: Respirations unlabored, no increased work of breathing with use of accessory muscles and retractions. No stridor.  CARDIAC: Warm extremities, no cyanosis.                 LABS:   Basic Metabolic Panel  (08.07.20 @ 01:00)    Sodium, Serum: 141 mmol/L    Potassium, Serum: 4.7 mmol/L    Chloride, Serum: 101: Delta: 95 on 08/06/  Delta: 95 on 08/06/ mmol/L    Carbon Dioxide, Serum: 24 mmol/L    Anion Gap, Serum: 16 mmo/L    Blood Urea Nitrogen, Serum: 17 mg/dL    Creatinine, Serum: 1.07 mg/dL    Glucose, Serum: 137 mg/dL      Calcium, Total Serum: 9.6 mg/dL    Magnesium, Serum: 2.4 mg/dL    Phosphorus Level, Serum: 2.2: Delta: 3.2 on 08/06/

## 2020-08-07 NOTE — PROGRESS NOTE ADULT - SUBJECTIVE AND OBJECTIVE BOX
SURGICAL INTENSIVE CARE UNIT DAILY PROGRESS NOTE    24 HOUR EVENTS:   - d/c'ed unique; passed TOV  - advanced to regular diet    HPI:  55y recently admitted to [facility] with worsening pituitary adenoma w/ visual changes. Here for PRE OP for TSP w/ ENT. (05 Aug 2020 06:40)      NEURO  - awake, alert, responds appropriately  - Tylenol, dilaudid PRN for pain    RESPIRATORY  RR: 14 (08-07-20 @ 00:00) (11 - 18)  SpO2: 93% (08-07-20 @ 00:00) (92% - 96%)  Wt(kg): --    CARDIOVASCULAR  HR: 68 (08-07-20 @ 00:00) (63 - 86)  BP: 163/87 (08-07-20 @ 00:00) (119/63 - 171/91)  BP(mean): 107 (08-07-20 @ 00:00) (76 - 110)  ABP: --  ABP(mean): --  Wt(kg): --  CVP(cm H2O): --    GI/NUTRITION  Diet: regular diet    GENITOURINARY  I&O's Detail    08-05 @ 07:01  -  08-06 @ 07:00  --------------------------------------------------------  IN:    IV PiggyBack: 200 mL    lactated ringers.: 1305 mL    Oral Fluid: 624 mL  Total IN: 2129 mL    OUT:    Indwelling Catheter - Urethral: 3160 mL  Total OUT: 3160 mL    Total NET: -1031 mL      08-06 @ 07:01  -  08-07 @ 01:21  --------------------------------------------------------  IN:    IV PiggyBack: 100 mL    lactated ringers.: 150 mL    Oral Fluid: 1290 mL  Total IN: 1540 mL    OUT:    Indwelling Catheter - Urethral: 190 mL    Voided: 800 mL  Total OUT: 990 mL    Total NET: 550 mL          08-06    136  |  95<L>  |  12  ----------------------------<  148<H>  4.2   |  24  |  0.94    Ca    9.4      06 Aug 2020 03:30  Phos  3.2     08-06  Mg     1.8     08-06        HEMATOLOGIC                        13.2   11.75 )-----------( 126      ( 06 Aug 2020 03:30 )             41.1     PT/INR - ( 06 Aug 2020 03:30 )   PT: 13.4 SEC;   INR: 1.18          PTT - ( 06 Aug 2020 03:30 )  PTT:25.5 SEC    INFECTIOUS DISEASES  RECENT CULTURES:      ENDOCRINE  CAPILLARY BLOOD GLUCOSE          IMAGING:

## 2020-08-07 NOTE — DISCHARGE NOTE PROVIDER - HOSPITAL COURSE
HPI:    55y recently admitted to [facility] with worsening pituitary adenoma w/ visual changes. Here for PRE OP for TSP w/ ENT. (05 Aug 2020 06:40)    Patient underwent TSP w/ lumbar drain placement and fat graft. LD removed post op in OR. Did well in ICU, intake/output monitored closely, patient did NOT go into DI. Tolerated diet and pain well, no leaking from nose. Cleared for d/c home today.

## 2020-08-07 NOTE — DISCHARGE NOTE PROVIDER - NSDCMRMEDTOKEN_GEN_ALL_CORE_FT
amLODIPine 10 mg oral tablet: 1 tab(s) orally once a day  lisinopril 20 mg oral tablet: 1 tab(s) orally once a day  Multiple Vitamins oral tablet: 1 tab(s) orally once a day  senna oral tablet: 2 tab(s) orally once a day (at bedtime) amLODIPine 10 mg oral tablet: 1 tab(s) orally once a day  lisinopril 20 mg oral tablet: 1 tab(s) orally once a day  Multiple Vitamins oral tablet: 1 tab(s) orally once a day  senna oral tablet: 2 tab(s) orally once a day (at bedtime)  sodium chloride 0.65% nasal spray: 1 spray(s) nasal 3 times a day, As Needed  Tylenol 325 mg oral tablet: 2 tab(s) orally every 4 hours, As Needed

## 2020-08-07 NOTE — CHART NOTE - NSCHARTNOTEFT_GEN_A_CORE
Unable to see patient today given he was discharged. Patient had repeat TFTs which were wnl. Repeat am cortisol level wnl.   Patient was noted to have low testosterone levels in the setting of low FSH and LH. Recommend that patient f/u with Endocrine outpatient. Appointment scheduled for patient.

## 2020-08-07 NOTE — DISCHARGE NOTE PROVIDER - PROVIDER TOKENS
PROVIDER:[TOKEN:[[id number]],FOLLOWUP:[1 week]],PROVIDER:[TOKEN:[[id number]],FOLLOWUP:[2 weeks]]

## 2020-08-10 RX ORDER — OXYCODONE HYDROCHLORIDE 5 MG/1
1 TABLET ORAL
Qty: 20 | Refills: 0
Start: 2020-08-10 | End: 2020-08-14

## 2020-08-12 DIAGNOSIS — D35.2 BENIGN NEOPLASM OF PITUITARY GLAND: ICD-10-CM

## 2020-08-12 DIAGNOSIS — H93.8X9 OTHER SPECIFIED DISORDERS OF EAR, UNSPECIFIED EAR: ICD-10-CM

## 2020-08-12 DIAGNOSIS — H53.47 HETERONYMOUS BILATERAL FIELD DEFECTS: ICD-10-CM

## 2020-08-12 DIAGNOSIS — H60.509 UNSPECIFIED ACUTE NONINFECTIVE OTITIS EXTERNA, UNSPECIFIED EAR: ICD-10-CM

## 2020-08-12 DIAGNOSIS — R93.0 ABNORMAL FINDINGS ON DIAGNOSTIC IMAGING OF SKULL AND HEAD, NOT ELSEWHERE CLASSIFIED: ICD-10-CM

## 2020-08-19 ENCOUNTER — APPOINTMENT (OUTPATIENT)
Dept: NEUROSURGERY | Facility: CLINIC | Age: 55
End: 2020-08-19

## 2020-08-19 ENCOUNTER — APPOINTMENT (OUTPATIENT)
Dept: OTOLARYNGOLOGY | Facility: CLINIC | Age: 55
End: 2020-08-19
Payer: MEDICAID

## 2020-08-19 VITALS
DIASTOLIC BLOOD PRESSURE: 96 MMHG | HEART RATE: 71 BPM | WEIGHT: 230 LBS | HEIGHT: 72 IN | SYSTOLIC BLOOD PRESSURE: 140 MMHG | BODY MASS INDEX: 31.15 KG/M2

## 2020-08-19 DIAGNOSIS — H93.8X9 OTHER SPECIFIED DISORDERS OF EAR, UNSPECIFIED EAR: ICD-10-CM

## 2020-08-19 DIAGNOSIS — H53.47 HETERONYMOUS BILATERAL FIELD DEFECTS: ICD-10-CM

## 2020-08-19 PROCEDURE — 31237 NSL/SINS NDSC SURG BX POLYPC: CPT | Mod: 50,79

## 2020-08-19 NOTE — HISTORY OF PRESENT ILLNESS
[de-identified] : 55M s/p TSPR 8/5 with Dr. Briscoe for pituitary macroadenoma with suprasellar extension\par Surgery uncomplicated, no intraoperative CSF leak\par L NSF harvested but not used, sewed back to septum and bolstered with silicon\par Path reviewed- c/w pit adenoma\par Feels well since surgery-- reports appropriate post-op pain and congestion\par Sneezed yesterday and silicon sheet extruded from nose\par Also previously treated for fungal OE at [facility] w gentian violet, boric powder, clotrimazole-- since then, reports complete resolution of otologic symptoms

## 2020-08-19 NOTE — PHYSICAL EXAM
[[email or web address]] : scant fungal debris [Nasal Endoscopy Performed] : nasal endoscopy was performed, see procedure section for findings [Normal] : no rashes

## 2020-09-02 ENCOUNTER — APPOINTMENT (OUTPATIENT)
Dept: NEUROSURGERY | Facility: CLINIC | Age: 55
End: 2020-09-02

## 2020-09-03 ENCOUNTER — APPOINTMENT (OUTPATIENT)
Dept: OTOLARYNGOLOGY | Facility: CLINIC | Age: 55
End: 2020-09-03
Payer: SELF-PAY

## 2020-09-03 VITALS
BODY MASS INDEX: 31.15 KG/M2 | RESPIRATION RATE: 18 BRPM | HEIGHT: 72 IN | DIASTOLIC BLOOD PRESSURE: 98 MMHG | WEIGHT: 230 LBS | TEMPERATURE: 98 F | SYSTOLIC BLOOD PRESSURE: 163 MMHG

## 2020-09-03 DIAGNOSIS — J32.9 CHRONIC SINUSITIS, UNSPECIFIED: ICD-10-CM

## 2020-09-03 DIAGNOSIS — D35.2 BENIGN NEOPLASM OF PITUITARY GLAND: ICD-10-CM

## 2020-09-03 DIAGNOSIS — H60.509 UNSPECIFIED ACUTE NONINFECTIVE OTITIS EXTERNA, UNSPECIFIED EAR: ICD-10-CM

## 2020-09-03 PROCEDURE — 31231 NASAL ENDOSCOPY DX: CPT | Mod: NC

## 2020-09-03 PROCEDURE — 99213 OFFICE O/P EST LOW 20 MIN: CPT | Mod: NC

## 2020-09-03 NOTE — HISTORY OF PRESENT ILLNESS
[de-identified] : 55M s/p TSPR 8/5 with Dr. Briscoe for pituitary macroadenoma with suprasellar extension\par LCV 8/19 for debridement\par Eyesight now improved\par Recently saw NSGY\par Feels well\par Denies sinonasal symptoms or otologic symptoms\par Continues to use saline

## 2020-11-10 ENCOUNTER — APPOINTMENT (OUTPATIENT)
Dept: ENDOCRINOLOGY | Facility: CLINIC | Age: 55
End: 2020-11-10

## 2021-06-24 NOTE — ASU PREOP CHECKLIST - LATEX ALLERGY

## 2023-05-16 NOTE — PATIENT PROFILE ADULT - AGENT'S NAME
[de-identified] : General Appearance / Station: Well developed, well nourished, in no acute distress \par Orientation: Oriented to person, place, and time\par Gait & Station: Ambulates with walker for assistive device\par Neurologic: Normal leg sensation \par Cardiovascular: Warm extremity \par Lymphatics: No lymphedema \par Generalized Ligament Laxity: Normal \par Stiffness: Normal \par \par LUMBAR SPINE:\par Nontender  at lumbar spine\par Straight leg raise: Negative \par Motor: 5/5 motor L2-S1\par Sensation: Intact  L2-S1\par \par LEFT HIP:\par Range of motion: Painless  internal and external rotation of the hip.\par Strength: Within Normal Limits \par Palpation: Nontender  at greater trochanter. Nontender  at SI joint\par Stinchfield: Negative \par FADIR: Negative \par MILAD: Negative \par \par SYMPTOMATIC LEFT KNEE:\par Alignment: Neutral \par Skin: Well-healed midline incision\par Effusion: none .\par Quadriceps: normal .  Able to straight leg raise\par Range of motion: symmetrical but painful .\par PF crepitus: 1+.\par PF apprehension: none .\par Patella / Patella Tendon: nontender .\par Lachman's: negative \par Valgus @ 30°: negative.\par Varus @ 30°: negative.\par Posterior drawer: negative.\par Palpation: TENDER lateral facet of patella\par Meniscus signs: Negative\par \par ASYMPTOMATIC RIGHT KNEE:\par Alignment: Neutral[]VARUS\par Skin: normal\par Effusion: none .\par Quadriceps: normal .\par Range of motion: symmetrical .\par PF crepitus: none .\par PF apprehension: none .\par Patella / Patella Tendon: nontender .\par Lachman's: negative \par Valgus @ 30°: negative.\par Varus @ 30°: negative.\par Posterior drawer: negative.\par Palpation: nontender.\par Meniscus signs: negative .\par \par  Geraldine Eid